# Patient Record
Sex: MALE | Race: WHITE | NOT HISPANIC OR LATINO | Employment: PART TIME | URBAN - METROPOLITAN AREA
[De-identification: names, ages, dates, MRNs, and addresses within clinical notes are randomized per-mention and may not be internally consistent; named-entity substitution may affect disease eponyms.]

---

## 2019-03-23 ENCOUNTER — HOSPITAL ENCOUNTER (INPATIENT)
Facility: OTHER | Age: 29
LOS: 6 days | Discharge: NURSING, REHAB OR LONG-TERM CARE | DRG: 917 | End: 2019-03-29
Attending: INTERNAL MEDICINE | Admitting: INTERNAL MEDICINE
Payer: MEDICAID

## 2019-03-23 DIAGNOSIS — I49.9 ARRHYTHMIA: ICD-10-CM

## 2019-03-23 DIAGNOSIS — E06.3 HASHIMOTO'S THYROIDITIS: Primary | Chronic | ICD-10-CM

## 2019-03-23 DIAGNOSIS — J96.90 RESPIRATORY FAILURE: ICD-10-CM

## 2019-03-23 DIAGNOSIS — J96.01 ACUTE RESPIRATORY FAILURE WITH HYPOXIA: ICD-10-CM

## 2019-03-23 PROBLEM — G93.41 ENCEPHALOPATHY, METABOLIC: Status: ACTIVE | Noted: 2019-03-23

## 2019-03-23 PROBLEM — T58.91XA TOXIC EFFECT OF CARBON MONOXIDE, UNINTENTIONAL: Status: ACTIVE | Noted: 2019-03-23

## 2019-03-23 PROBLEM — A41.9 SEVERE SEPSIS: Status: ACTIVE | Noted: 2019-03-23

## 2019-03-23 PROBLEM — M62.82 NON-TRAUMATIC RHABDOMYOLYSIS: Status: ACTIVE | Noted: 2019-03-23

## 2019-03-23 PROBLEM — R65.20 SEVERE SEPSIS: Status: ACTIVE | Noted: 2019-03-23

## 2019-03-23 PROBLEM — R74.01 TRANSAMINITIS: Status: ACTIVE | Noted: 2019-03-23

## 2019-03-23 PROBLEM — E87.6 HYPOKALEMIA: Status: ACTIVE | Noted: 2019-03-23

## 2019-03-23 PROBLEM — N17.9 AKI (ACUTE KIDNEY INJURY): Status: ACTIVE | Noted: 2019-03-23

## 2019-03-23 LAB
ALLENS TEST: ABNORMAL
ANION GAP SERPL CALC-SCNC: 8 MMOL/L
ANION GAP SERPL CALC-SCNC: 9 MMOL/L
BUN SERPL-MCNC: 14 MG/DL
BUN SERPL-MCNC: 16 MG/DL
CALCIUM SERPL-MCNC: 8.9 MG/DL
CALCIUM SERPL-MCNC: 9 MG/DL
CHLORIDE SERPL-SCNC: 108 MMOL/L
CHLORIDE SERPL-SCNC: 108 MMOL/L
CO2 SERPL-SCNC: 22 MMOL/L
CO2 SERPL-SCNC: 25 MMOL/L
CORTIS SERPL-MCNC: 23.8 UG/DL
CREAT SERPL-MCNC: 1.3 MG/DL
CREAT SERPL-MCNC: 1.3 MG/DL
DELSYS: ABNORMAL
EST. GFR  (AFRICAN AMERICAN): >60 ML/MIN/1.73 M^2
EST. GFR  (AFRICAN AMERICAN): >60 ML/MIN/1.73 M^2
EST. GFR  (NON AFRICAN AMERICAN): >60 ML/MIN/1.73 M^2
EST. GFR  (NON AFRICAN AMERICAN): >60 ML/MIN/1.73 M^2
FIO2: 100
GLUCOSE SERPL-MCNC: 80 MG/DL
GLUCOSE SERPL-MCNC: 87 MG/DL
HCO3 UR-SCNC: 24.1 MMOL/L (ref 24–28)
LACTATE SERPL-SCNC: 3 MMOL/L
LACTATE SERPL-SCNC: 3.8 MMOL/L
MAGNESIUM SERPL-MCNC: 2 MG/DL
MIN VOL: 7.24
MODE: ABNORMAL
PCO2 BLDA: 42.8 MMHG (ref 35–45)
PEEP: 5
PH SMN: 7.36 [PH] (ref 7.35–7.45)
PHOSPHATE SERPL-MCNC: 4 MG/DL
PO2 BLDA: 224 MMHG (ref 80–100)
POC BE: -1 MMOL/L
POC SATURATED O2: 100 % (ref 95–100)
POTASSIUM SERPL-SCNC: 4.1 MMOL/L
POTASSIUM SERPL-SCNC: 4.2 MMOL/L
PS: 5
SAMPLE: ABNORMAL
SITE: ABNORMAL
SODIUM SERPL-SCNC: 139 MMOL/L
SODIUM SERPL-SCNC: 141 MMOL/L
SP02: 100
SPONT RATE: 14

## 2019-03-23 PROCEDURE — 83735 ASSAY OF MAGNESIUM: CPT

## 2019-03-23 PROCEDURE — 99291 PR CRITICAL CARE, E/M 30-74 MINUTES: ICD-10-PCS | Mod: ,,, | Performed by: INTERNAL MEDICINE

## 2019-03-23 PROCEDURE — 84100 ASSAY OF PHOSPHORUS: CPT

## 2019-03-23 PROCEDURE — 63600175 PHARM REV CODE 636 W HCPCS: Performed by: INTERNAL MEDICINE

## 2019-03-23 PROCEDURE — 20000000 HC ICU ROOM

## 2019-03-23 PROCEDURE — 99291 CRITICAL CARE FIRST HOUR: CPT | Mod: ,,, | Performed by: INTERNAL MEDICINE

## 2019-03-23 PROCEDURE — 93010 ELECTROCARDIOGRAM REPORT: CPT | Mod: ,,, | Performed by: INTERNAL MEDICINE

## 2019-03-23 PROCEDURE — 94761 N-INVAS EAR/PLS OXIMETRY MLT: CPT

## 2019-03-23 PROCEDURE — 82533 TOTAL CORTISOL: CPT

## 2019-03-23 PROCEDURE — 25000003 PHARM REV CODE 250: Performed by: INTERNAL MEDICINE

## 2019-03-23 PROCEDURE — 93005 ELECTROCARDIOGRAM TRACING: CPT

## 2019-03-23 PROCEDURE — 36600 WITHDRAWAL OF ARTERIAL BLOOD: CPT

## 2019-03-23 PROCEDURE — 83605 ASSAY OF LACTIC ACID: CPT | Mod: 91

## 2019-03-23 PROCEDURE — 36415 COLL VENOUS BLD VENIPUNCTURE: CPT

## 2019-03-23 PROCEDURE — 27100171 HC OXYGEN HIGH FLOW UP TO 24 HOURS

## 2019-03-23 PROCEDURE — 82803 BLOOD GASES ANY COMBINATION: CPT

## 2019-03-23 PROCEDURE — 80048 BASIC METABOLIC PNL TOTAL CA: CPT | Mod: 91

## 2019-03-23 PROCEDURE — 93010 EKG 12-LEAD: ICD-10-PCS | Mod: ,,, | Performed by: INTERNAL MEDICINE

## 2019-03-23 PROCEDURE — 94002 VENT MGMT INPAT INIT DAY: CPT

## 2019-03-23 PROCEDURE — 99900035 HC TECH TIME PER 15 MIN (STAT)

## 2019-03-23 RX ORDER — LEVOTHYROXINE SODIUM ANHYDROUS 100 UG/5ML
100 INJECTION, POWDER, LYOPHILIZED, FOR SOLUTION INTRAVENOUS DAILY
Status: DISCONTINUED | OUTPATIENT
Start: 2019-03-23 | End: 2019-03-23

## 2019-03-23 RX ORDER — FAMOTIDINE 10 MG/ML
20 INJECTION INTRAVENOUS 2 TIMES DAILY
Status: DISCONTINUED | OUTPATIENT
Start: 2019-03-23 | End: 2019-03-23

## 2019-03-23 RX ORDER — CHLORHEXIDINE GLUCONATE ORAL RINSE 1.2 MG/ML
15 SOLUTION DENTAL 2 TIMES DAILY
Status: DISCONTINUED | OUTPATIENT
Start: 2019-03-23 | End: 2019-03-23

## 2019-03-23 RX ORDER — HALOPERIDOL 5 MG/ML
1 INJECTION INTRAMUSCULAR ONCE
Status: COMPLETED | OUTPATIENT
Start: 2019-03-23 | End: 2019-03-23

## 2019-03-23 RX ORDER — SODIUM CHLORIDE 0.9 % (FLUSH) 0.9 %
3 SYRINGE (ML) INJECTION
Status: DISCONTINUED | OUTPATIENT
Start: 2019-03-23 | End: 2019-03-29 | Stop reason: HOSPADM

## 2019-03-23 RX ORDER — LEVOTHYROXINE SODIUM ANHYDROUS 200 UG/5ML
200 INJECTION, POWDER, LYOPHILIZED, FOR SOLUTION INTRAVENOUS ONCE
Status: DISCONTINUED | OUTPATIENT
Start: 2019-03-23 | End: 2019-03-23

## 2019-03-23 RX ORDER — SODIUM CHLORIDE 9 MG/ML
INJECTION, SOLUTION INTRAVENOUS CONTINUOUS
Status: ACTIVE | OUTPATIENT
Start: 2019-03-23 | End: 2019-03-25

## 2019-03-23 RX ORDER — LEVOTHYROXINE SODIUM ANHYDROUS 100 UG/5ML
100 INJECTION, POWDER, LYOPHILIZED, FOR SOLUTION INTRAVENOUS ONCE
Status: COMPLETED | OUTPATIENT
Start: 2019-03-23 | End: 2019-03-23

## 2019-03-23 RX ORDER — PROPOFOL 10 MG/ML
5 INJECTION, EMULSION INTRAVENOUS CONTINUOUS
Status: DISCONTINUED | OUTPATIENT
Start: 2019-03-23 | End: 2019-03-23

## 2019-03-23 RX ADMIN — HALOPERIDOL LACTATE 1 MG: 5 INJECTION, SOLUTION INTRAMUSCULAR at 09:03

## 2019-03-23 RX ADMIN — HYDROCORTISONE SODIUM SUCCINATE 100 MG: 100 INJECTION, POWDER, FOR SOLUTION INTRAMUSCULAR; INTRAVENOUS at 05:03

## 2019-03-23 RX ADMIN — LEVOTHYROXINE SODIUM ANHYDROUS 100 MCG: 100 INJECTION, POWDER, LYOPHILIZED, FOR SOLUTION INTRAVENOUS at 05:03

## 2019-03-23 RX ADMIN — SODIUM CHLORIDE: 0.9 INJECTION, SOLUTION INTRAVENOUS at 03:03

## 2019-03-23 RX ADMIN — LEVOTHYROXINE SODIUM 175 MCG: 100 TABLET ORAL at 02:03

## 2019-03-23 RX ADMIN — HYDROCORTISONE SODIUM SUCCINATE 100 MG: 100 INJECTION, POWDER, FOR SOLUTION INTRAMUSCULAR; INTRAVENOUS at 09:03

## 2019-03-23 RX ADMIN — PIPERACILLIN AND TAZOBACTAM 4.5 G: 4; .5 INJECTION, POWDER, LYOPHILIZED, FOR SOLUTION INTRAVENOUS; PARENTERAL at 05:03

## 2019-03-23 RX ADMIN — SODIUM CHLORIDE: 0.9 INJECTION, SOLUTION INTRAVENOUS at 07:03

## 2019-03-23 NOTE — ASSESSMENT & PLAN NOTE
- ?2/2 poor perfusion; s/p 30mL/kg IVFs at [facility].  - Continue to monitor.

## 2019-03-23 NOTE — ASSESSMENT & PLAN NOTE
- ?2/2 poor perfusion; s/p 30mL/kg IVFs at [facility].  - Continue to monitor.

## 2019-03-23 NOTE — ASSESSMENT & PLAN NOTE
- Acute respiratory failure, suspect in the setting of carbon monoxide poisoning vs. sepsis. CXR with some atelectasis bilateral bases, also noted on CT. Does have elevations in creatinine and liver enzymes that could reflect poor oxygenation / blood flow.  - Carboxyhemoglobin reported via Flight Team of 7.2; unable to repeat here, unfortunately.  - ?infectious but less remarkable from story; continue empiric antibiotics with piperacillin-tazobactam 4.5g IV q8hr.  - Discuss with pulm/crit; likely may be able to extubate if mental status improving. Would continue with NRB at 15L to flush out CO as feasible.

## 2019-03-23 NOTE — H&P
[facility] Hospital Medicine  History & Physical    Patient Name: Dorian Murray  MRN: [id number]  Admission Date: 3/23/2019  Attending Physician: PAPI Villagran MD  Primary Care Provider: No primary care provider on file.    Patient information was obtained from parent and ER records.     Subjective:     Principal Problem:Acute respiratory failure with hypoxia    Chief Complaint: No chief complaint on file.       HPI: Mr. Murray is a 29/M with reported PMH of Hashimoto's who presented to [facility] ED via EMS after having been found altered on the morning of 03/23. History obtained from the patient's mother as well as ED notes. Reportedly he was found down in his car ([employer]) outside a gas station; had been noted on security cameras at gas station around 0345. Supposedly was last seen normal by his ex-wife the evening of 03/22 when he took his daughter to dinner. EMS arrived after being notified by gas station personnel after his car was stationary and still running. EMS arrived and bagged the patient, and he was transported to [facility] ED. He was subsequently intubated for airway protection and hypoxic respiratory failure. Reportedly, his carboxyhemoglobin on ABG was measured at 7.2.    Patient's mother reports that he has a history of Hashimoto's; she has thyroid issues as well. Supposedly he had been on thyroid medication in the remote past, but she believes he has not been taking his medication for a significant period of time.    Past Medical History:   Diagnosis Date    Hashimoto's thyroiditis        History reviewed. No pertinent surgical history.    Review of patient's allergies indicates:  Not on File    Current Facility-Administered Medications on File Prior to Encounter   Medication    [COMPLETED] albuterol-ipratropium 2.5 mg-0.5 mg/3 mL nebulizer solution 3 mL    [COMPLETED] naloxone injection 0.4 mg    [COMPLETED] naloxone injection 0.4 mg     [COMPLETED] naloxone injection 0.4 mg    [COMPLETED] naloxone injection 0.4 mg    [COMPLETED] piperacillin-tazobactam 4.5 g in sodium chloride 0.9% 100 mL IVPB (ready to mix system)    [COMPLETED] potassium chloride 10 mEq in 100 mL IVPB    [COMPLETED] potassium chloride 10% oral solution 60 mEq    [COMPLETED] sodium chloride 0.9% bolus 2,721 mL    [COMPLETED] succinylcholine injection 100 mg    [COMPLETED] vancomycin 1 g in 0.9% sodium chloride 250 mL IVPB (ready to mix system)     No current outpatient medications on file prior to encounter.     Family History     Problem Relation (Age of Onset)    Thyroid disease Mother        Tobacco Use    Smoking status: Not on file   Substance and Sexual Activity    Alcohol use: Not on file    Drug use: Not on file    Sexual activity: Not on file     Review of Systems   Unable to perform ROS: Intubated     Objective:     Vital Signs (Most Recent):  Temp: 98.1 °F (36.7 °C) (03/23/19 1230)  Pulse: 86 (03/23/19 1233)  Resp: 18 (03/23/19 1230)  BP: 108/68 (03/23/19 1230)  SpO2: 96 % (03/23/19 1233) Vital Signs (24h Range):  Temp:  [96.2 °F (35.7 °C)-98.1 °F (36.7 °C)] 98.1 °F (36.7 °C)  Pulse:  [] 86  Resp:  [16-21] 18  SpO2:  [80 %-100 %] 96 %  BP: (101-130)/(45-85) 108/68     Weight: 99.6 kg (219 lb 9.3 oz)  Body mass index is 33.39 kg/m².    Physical Exam   Constitutional: He appears well-developed and well-nourished. No distress.   HENT:   Head: Normocephalic and atraumatic.   Eyes: Conjunctivae and EOM are normal.   Cardiovascular: Normal rate, regular rhythm, S1 normal, S2 normal and intact distal pulses.   Pulmonary/Chest: Effort normal. No respiratory distress.   Intubated, mechanical sounds bilaterally   Abdominal: Soft. He exhibits no distension. There is no tenderness.   Musculoskeletal: Normal range of motion. He exhibits no edema.   Neurological: He is alert.   CAM-ICU +, RASS 0   Skin: Skin is warm and dry.   Nursing note and vitals  reviewed.    Significant Labs:   Recent Results (from the past 24 hour(s))   CBC auto differential    Collection Time: 03/23/19  8:45 AM   Result Value Ref Range    WBC 18.00 (H) 3.90 - 12.70 K/uL    RBC 4.18 (L) 4.60 - 6.20 M/uL    Hemoglobin 13.2 (L) 14.0 - 18.0 g/dL    Hematocrit 38.0 (L) 40.0 - 54.0 %    MCV 91 82 - 98 fL    MCH 31.6 (H) 27.0 - 31.0 pg    MCHC 34.8 32.0 - 36.0 g/dL    RDW 14.9 (H) 11.5 - 14.5 %    Platelets 257 150 - 350 K/uL    MPV 8.5 (L) 9.2 - 12.9 fL    Gran # (ANC) 12.2 (H) 1.8 - 7.7 K/uL    Lymph # 4.7 1.0 - 4.8 K/uL    Mono # 0.8 0.3 - 1.0 K/uL    Eos # 0.2 0.0 - 0.5 K/uL    Baso # 0.10 0.00 - 0.20 K/uL    Gran% 67.8 38.0 - 73.0 %    Lymph% 26.0 18.0 - 48.0 %    Mono% 4.3 4.0 - 15.0 %    Eosinophil% 1.2 0.0 - 8.0 %    Basophil% 0.7 0.0 - 1.9 %    Differential Method Automated    Comprehensive metabolic panel    Collection Time: 03/23/19  8:45 AM   Result Value Ref Range    Sodium 138 136 - 145 mmol/L    Potassium 2.4 (LL) 3.5 - 5.1 mmol/L    Chloride 99 (L) 101 - 111 mmol/L    CO2 20 (L) 23 - 29 mmol/L    Glucose 193 (H) 74 - 118 mg/dL    BUN, Bld 19 6 - 20 mg/dL    Creatinine 1.6 (H) 0.5 - 1.4 mg/dL    Calcium 9.2 8.6 - 10.0 mg/dL    Total Protein 7.9 6.0 - 8.4 g/dL    Albumin 4.4 3.5 - 5.2 g/dL    Total Bilirubin 0.6 0.3 - 1.2 mg/dL    Alkaline Phosphatase 44 38 - 126 U/L     (H) 15 - 41 U/L     (H) 17 - 63 U/L    Anion Gap 19 (H) 8 - 16 mmol/L    eGFR if African American >60.0 >60 mL/min/1.73 m^2    eGFR if non  56.6 (A) >60 mL/min/1.73 m^2   Troponin I    Collection Time: 03/23/19  8:45 AM   Result Value Ref Range    Troponin I 0.02 0.01 - 0.05 ng/mL   POCT glucose    Collection Time: 03/23/19  8:56 AM   Result Value Ref Range    POCT Glucose 172 (H) 70 - 110 mg/dL   Urinalysis, Reflex to Urine Culture Urine, Clean Catch    Collection Time: 03/23/19  9:09 AM   Result Value Ref Range    Specimen UA Urine, Catheterized     Color, UA Yellow Yellow, Straw,  Sil    Appearance, UA Clear Clear    pH, UA 6.0 5.0 - 8.0    Specific Gravity, UA 1.025 1.005 - 1.030    Protein, UA Negative Negative    Glucose, UA Negative Negative    Ketones, UA Negative Negative    Bilirubin (UA) Negative Negative    Occult Blood UA Negative Negative    Nitrite, UA Negative Negative    Urobilinogen, UA Negative Negative EU/dL    Leukocytes, UA Negative Negative   Drug screen panel, emergency    Collection Time: 03/23/19  9:09 AM   Result Value Ref Range    Amphetamine Screen, Ur Negative     Barbiturate Screen, Ur Negative     Benzodiazepines Negative     Cocaine (Metab.) Negative     Opiate Scrn, Ur Negative     Phencyclidine Negative     THC Negative     Tricyclic Antidepressants (TCA), Urine Negative     MDMA- ECSTASY Negative     OXYCODONE Negative     PROPOXYPHENE Negative     Toxicology Information SEE COMMENT    Lactic acid, plasma #1    Collection Time: 03/23/19  9:15 AM   Result Value Ref Range    Lactate (Lactic Acid) 6.6 (HH) 0.5 - 2.2 mmol/L   Ethanol    Collection Time: 03/23/19  9:15 AM   Result Value Ref Range    Alcohol, Medical, Serum <5 <10 mg/dL   TSH    Collection Time: 03/23/19  9:15 AM   Result Value Ref Range    TSH >100.00 (H) 0.45 - 5.33 uIU/mL   ISTAT PROCEDURE    Collection Time: 03/23/19  9:35 AM   Result Value Ref Range    POC PH 7.370 7.35 - 7.45    POC PCO2 35.7 35 - 45 mmHg    POC PO2 76 (L) 80 - 100 mmHg    POC HCO3 20.6 (L) 24 - 28 mmol/L    POC BE -5 -2 to 2 mmol/L    POC SATURATED O2 95 95 - 100 %    POC TCO2 22 (L) 23 - 27 mmol/L    POC Hematocrit 32 (L) 36 - 54 %PCV    POC HEMOGLOBIN 11 g/dL    Rate 16     Sample ARTERIAL     Site LR     Allens Test Pass     DelSys Adult Vent     Mode AC/PRVC     Vt 500     PEEP 5     FiO2 100    ISTAT PROCEDURE    Collection Time: 03/23/19 11:26 AM   Result Value Ref Range    POC PH 7.356 7.35 - 7.45    POC PCO2 41.0 35 - 45 mmHg    POC PO2 133 (H) 80 - 100 mmHg    POC HCO3 22.9 (L) 24 - 28 mmol/L    POC BE -3 -2 to 2  mmol/L    POC SATURATED O2 99 95 - 100 %    POC TCO2 24 23 - 27 mmol/L    POC Hematocrit 29 (L) 36 - 54 %PCV    POC HEMOGLOBIN 10 g/dL    Rate 16     Sample ARTERIAL     Site LR     Allens Test Pass     DelSys Adult Vent     Mode AC/PRVC     Vt 500     PEEP 5     FiO2 100      Significant Imaging:   CT Head WO Contrast 03/23/19:  No acute intracranial findings.  Prominence of the pituitary gland suggested as discussed above and follow-up MRI of the sella can be obtained to better assess.  Otherwise normal CT appearance of brain.    CXR 03/23/19:  Increasing pulmonary opacities bilaterally, atelectasis or pneumonia. Endotracheal and NG tube positions appear to be satisfactory.    CT Abd/Pelvis WO Contrast 03/23/19:  Bilateral lower lobe airspace disease with complete consolidation except for bronchi and lower lobe segments basally, pneumonia versus atelectasis. No significant abdominal or pelvic findings or acute abdominal or pelvic abnormality seen.    Assessment/Plan:     * Acute respiratory failure with hypoxia    - Acute respiratory failure, suspect in the setting of carbon monoxide poisoning vs. sepsis. CXR with some atelectasis bilateral bases, also noted on CT. Does have elevations in creatinine and liver enzymes that could reflect poor oxygenation / blood flow.  - Carboxyhemoglobin reported via Flight Team of 7.2; unable to repeat here, unfortunately.  - ?infectious but less remarkable from story; continue empiric antibiotics with piperacillin-tazobactam 4.5g IV q8hr.  - Discuss with pulm/crit; likely may be able to extubate if mental status improving. Would continue with NRB at 15L to flush out CO as feasible.     Toxic effect of carbon monoxide, unintentional    - As under respiratory failure.     Hashimoto's thyroiditis    - Reported history of Hashimoto's without treatment; TSH noted to be significantly elevated. FT4 pending. May give IV T4 pending results.     Hypokalemia    - Repletion started at [facility];  continue.     Severe sepsis    - As under acute respiratory failure.     Encephalopathy, metabolic    - Likely 2/2 acute respiratory failure.     KLAUDIA (acute kidney injury)    - ?2/2 poor perfusion; s/p 30mL/kg IVFs at [facility].  - Continue to monitor.     Transaminitis    - ?2/2 poor perfusion; s/p 30mL/kg IVFs at [facility].  - Continue to monitor.       VTE Risk Mitigation (From admission, onward)        Ordered     IP VTE LOW RISK PATIENT  Once      03/23/19 1228     Place sequential compression device  Until discontinued      03/23/19 1228        Critical care time spent on the evaluation and treatment of severe organ dysfunction, review of pertinent labs and imaging studies, discussions with consulting providers and discussions with patient/family: 45 minutes.    PAPI Villagran MD  Department of Hospital Medicine   [facility]

## 2019-03-23 NOTE — SUBJECTIVE & OBJECTIVE
Past Medical History:   Diagnosis Date    Hashimoto's thyroiditis        History reviewed. No pertinent surgical history.    Review of patient's allergies indicates:  Not on File    Current Facility-Administered Medications on File Prior to Encounter   Medication    [COMPLETED] albuterol-ipratropium 2.5 mg-0.5 mg/3 mL nebulizer solution 3 mL    [COMPLETED] naloxone injection 0.4 mg    [COMPLETED] naloxone injection 0.4 mg    [COMPLETED] naloxone injection 0.4 mg    [COMPLETED] naloxone injection 0.4 mg    [COMPLETED] piperacillin-tazobactam 4.5 g in sodium chloride 0.9% 100 mL IVPB (ready to mix system)    [COMPLETED] potassium chloride 10 mEq in 100 mL IVPB    [COMPLETED] potassium chloride 10% oral solution 60 mEq    [COMPLETED] sodium chloride 0.9% bolus 2,721 mL    [COMPLETED] succinylcholine injection 100 mg    [COMPLETED] vancomycin 1 g in 0.9% sodium chloride 250 mL IVPB (ready to mix system)     No current outpatient medications on file prior to encounter.     Family History     Problem Relation (Age of Onset)    Thyroid disease Mother        Tobacco Use    Smoking status: Not on file   Substance and Sexual Activity    Alcohol use: Not on file    Drug use: Not on file    Sexual activity: Not on file     Review of Systems   Unable to perform ROS: Intubated     Objective:     Vital Signs (Most Recent):  Temp: 98.1 °F (36.7 °C) (03/23/19 1230)  Pulse: 86 (03/23/19 1233)  Resp: 18 (03/23/19 1230)  BP: 108/68 (03/23/19 1230)  SpO2: 96 % (03/23/19 1233) Vital Signs (24h Range):  Temp:  [96.2 °F (35.7 °C)-98.1 °F (36.7 °C)] 98.1 °F (36.7 °C)  Pulse:  [] 86  Resp:  [16-21] 18  SpO2:  [80 %-100 %] 96 %  BP: (101-130)/(45-85) 108/68     Weight: 99.6 kg (219 lb 9.3 oz)  Body mass index is 33.39 kg/m².    Physical Exam   Constitutional: He appears well-developed and well-nourished. No distress.   HENT:   Head: Normocephalic and atraumatic.   Eyes: Conjunctivae and EOM are normal.   Cardiovascular:  Normal rate, regular rhythm, S1 normal, S2 normal and intact distal pulses.   Pulmonary/Chest: Effort normal. No respiratory distress.   Intubated, mechanical sounds bilaterally   Abdominal: Soft. He exhibits no distension. There is no tenderness.   Musculoskeletal: Normal range of motion. He exhibits no edema.   Neurological: He is alert.   CAM-ICU +, RASS 0   Skin: Skin is warm and dry.   Nursing note and vitals reviewed.    Significant Labs:   Recent Results (from the past 24 hour(s))   CBC auto differential    Collection Time: 03/23/19  8:45 AM   Result Value Ref Range    WBC 18.00 (H) 3.90 - 12.70 K/uL    RBC 4.18 (L) 4.60 - 6.20 M/uL    Hemoglobin 13.2 (L) 14.0 - 18.0 g/dL    Hematocrit 38.0 (L) 40.0 - 54.0 %    MCV 91 82 - 98 fL    MCH 31.6 (H) 27.0 - 31.0 pg    MCHC 34.8 32.0 - 36.0 g/dL    RDW 14.9 (H) 11.5 - 14.5 %    Platelets 257 150 - 350 K/uL    MPV 8.5 (L) 9.2 - 12.9 fL    Gran # (ANC) 12.2 (H) 1.8 - 7.7 K/uL    Lymph # 4.7 1.0 - 4.8 K/uL    Mono # 0.8 0.3 - 1.0 K/uL    Eos # 0.2 0.0 - 0.5 K/uL    Baso # 0.10 0.00 - 0.20 K/uL    Gran% 67.8 38.0 - 73.0 %    Lymph% 26.0 18.0 - 48.0 %    Mono% 4.3 4.0 - 15.0 %    Eosinophil% 1.2 0.0 - 8.0 %    Basophil% 0.7 0.0 - 1.9 %    Differential Method Automated    Comprehensive metabolic panel    Collection Time: 03/23/19  8:45 AM   Result Value Ref Range    Sodium 138 136 - 145 mmol/L    Potassium 2.4 (LL) 3.5 - 5.1 mmol/L    Chloride 99 (L) 101 - 111 mmol/L    CO2 20 (L) 23 - 29 mmol/L    Glucose 193 (H) 74 - 118 mg/dL    BUN, Bld 19 6 - 20 mg/dL    Creatinine 1.6 (H) 0.5 - 1.4 mg/dL    Calcium 9.2 8.6 - 10.0 mg/dL    Total Protein 7.9 6.0 - 8.4 g/dL    Albumin 4.4 3.5 - 5.2 g/dL    Total Bilirubin 0.6 0.3 - 1.2 mg/dL    Alkaline Phosphatase 44 38 - 126 U/L     (H) 15 - 41 U/L     (H) 17 - 63 U/L    Anion Gap 19 (H) 8 - 16 mmol/L    eGFR if African American >60.0 >60 mL/min/1.73 m^2    eGFR if non  56.6 (A) >60 mL/min/1.73 m^2    Troponin I    Collection Time: 03/23/19  8:45 AM   Result Value Ref Range    Troponin I 0.02 0.01 - 0.05 ng/mL   POCT glucose    Collection Time: 03/23/19  8:56 AM   Result Value Ref Range    POCT Glucose 172 (H) 70 - 110 mg/dL   Urinalysis, Reflex to Urine Culture Urine, Clean Catch    Collection Time: 03/23/19  9:09 AM   Result Value Ref Range    Specimen UA Urine, Catheterized     Color, UA Yellow Yellow, Straw, Sil    Appearance, UA Clear Clear    pH, UA 6.0 5.0 - 8.0    Specific Gravity, UA 1.025 1.005 - 1.030    Protein, UA Negative Negative    Glucose, UA Negative Negative    Ketones, UA Negative Negative    Bilirubin (UA) Negative Negative    Occult Blood UA Negative Negative    Nitrite, UA Negative Negative    Urobilinogen, UA Negative Negative EU/dL    Leukocytes, UA Negative Negative   Drug screen panel, emergency    Collection Time: 03/23/19  9:09 AM   Result Value Ref Range    Amphetamine Screen, Ur Negative     Barbiturate Screen, Ur Negative     Benzodiazepines Negative     Cocaine (Metab.) Negative     Opiate Scrn, Ur Negative     Phencyclidine Negative     THC Negative     Tricyclic Antidepressants (TCA), Urine Negative     MDMA- ECSTASY Negative     OXYCODONE Negative     PROPOXYPHENE Negative     Toxicology Information SEE COMMENT    Lactic acid, plasma #1    Collection Time: 03/23/19  9:15 AM   Result Value Ref Range    Lactate (Lactic Acid) 6.6 (HH) 0.5 - 2.2 mmol/L   Ethanol    Collection Time: 03/23/19  9:15 AM   Result Value Ref Range    Alcohol, Medical, Serum <5 <10 mg/dL   TSH    Collection Time: 03/23/19  9:15 AM   Result Value Ref Range    TSH >100.00 (H) 0.45 - 5.33 uIU/mL   ISTAT PROCEDURE    Collection Time: 03/23/19  9:35 AM   Result Value Ref Range    POC PH 7.370 7.35 - 7.45    POC PCO2 35.7 35 - 45 mmHg    POC PO2 76 (L) 80 - 100 mmHg    POC HCO3 20.6 (L) 24 - 28 mmol/L    POC BE -5 -2 to 2 mmol/L    POC SATURATED O2 95 95 - 100 %    POC TCO2 22 (L) 23 - 27 mmol/L    POC  Hematocrit 32 (L) 36 - 54 %PCV    POC HEMOGLOBIN 11 g/dL    Rate 16     Sample ARTERIAL     Site LR     Allens Test Pass     DelSys Adult Vent     Mode AC/PRVC     Vt 500     PEEP 5     FiO2 100    ISTAT PROCEDURE    Collection Time: 03/23/19 11:26 AM   Result Value Ref Range    POC PH 7.356 7.35 - 7.45    POC PCO2 41.0 35 - 45 mmHg    POC PO2 133 (H) 80 - 100 mmHg    POC HCO3 22.9 (L) 24 - 28 mmol/L    POC BE -3 -2 to 2 mmol/L    POC SATURATED O2 99 95 - 100 %    POC TCO2 24 23 - 27 mmol/L    POC Hematocrit 29 (L) 36 - 54 %PCV    POC HEMOGLOBIN 10 g/dL    Rate 16     Sample ARTERIAL     Site LR     Allens Test Pass     DelSys Adult Vent     Mode AC/PRVC     Vt 500     PEEP 5     FiO2 100      Significant Imaging:   CT Head WO Contrast 03/23/19:  No acute intracranial findings.  Prominence of the pituitary gland suggested as discussed above and follow-up MRI of the sella can be obtained to better assess.  Otherwise normal CT appearance of brain.    CXR 03/23/19:  Increasing pulmonary opacities bilaterally, atelectasis or pneumonia. Endotracheal and NG tube positions appear to be satisfactory.    CT Abd/Pelvis WO Contrast 03/23/19:  Bilateral lower lobe airspace disease with complete consolidation except for bronchi and lower lobe segments basally, pneumonia versus atelectasis. No significant abdominal or pelvic findings or acute abdominal or pelvic abnormality seen.

## 2019-03-23 NOTE — CONSULTS
"[facility] Pulmonology/Critical Care Consult Note    Primary Team Admitting:  Tyshawn  Consultant Team Attending:  Wale  Consultant Team Fellow: Carolina    Date of Consult: 2019    Reason for Consult:      Acute Hypercapnic Hypoxic Respiratory Failure    Subjective:      History of Present Illness:  Dorian Murray is a 29 y.o. male with reported PMH of Hashimoto's who presented to [facility] ED via EMS after having been found altered on the morning of . History obtained from chart, the patient's mother as well as ED notes. Pt was reportedly found unresponsive in his car ( [employer]) outside a gas station, as seen on security cameras at gas station around 0345. He was last seen normal by his ex-wife the evening of  when he took his daughter to dinner. EMS arrived after being notified by gas station personnel that his car had remained stationary and was still running. EMS arrived and found him unresponsive.  He was placed on BVM and transported to [facility] ED where he was subsequently intubated for airway protection and hypoxic respiratory failure. Reportedly, his carboxyhemoglobin on ABG was measured at 7.2.  Patient's mother reports that he has a history of Hashimoto's, but believes he has not been taking his medication for a significant period of time despite being prescribed it in the past.        Past Medical History:  Past Medical History:   Diagnosis Date    Hashimoto's thyroiditis        Allergies:  Review of patient's allergies indicates:  No Known Allergies    Home Medications:  Prior to Admission medications    Not on File          Objective:   Last 24 Hour Vital Signs:  BP  Min: 100/59  Max: 130/62  Temp  Av.7 °F (36.5 °C)  Min: 96.2 °F (35.7 °C)  Max: 98.9 °F (37.2 °C)  Pulse  Av.3  Min: 84  Max: 152  Resp  Av.6  Min: 13  Max: 21  SpO2  Av %  Min: 80 %  Max: 100 %  Height  Av' 8" (172.7 cm)  Min: 5' 8" (172.7 cm)  Max: 5' 8" (172.7 cm)  Weight  Avg: " 93.7 kg (206 lb 8.4 oz)  Min: 90.7 kg (200 lb)  Max: 99.6 kg (219 lb 9.3 oz)  Body mass index is 33.39 kg/m².  No intake/output data recorded.    Physical Examination:  General: Drowsy but arouseable.  Obese   HENT:  NCAT; anicteric sclera; ETT in place  Cardio:  Regular rate and rhythm with normal S1 and S2; no murmurs  Resp:  Mechanical breath sounds bilaterally.  No wheezes.   Abdom: Soft, NTND with normoactive bowel sounds  Extrem: Warm and dry.  No edema  Pulses: 2+ and symmetric distally  Neuro:  RASS=0.  Following commands with no apparent focal deficits      Laboratory:  Most Recent Data:  Lab Results   Component Value Date    WBC 18.00 (H) 03/23/2019    HGB 13.2 (L) 03/23/2019    HCT 29 (L) 03/23/2019    MCV 91 03/23/2019     03/23/2019     Lab Results   Component Value Date    CREATININE 1.3 03/23/2019    BUN 16 03/23/2019     03/23/2019    K 4.2 03/23/2019     03/23/2019    CO2 25 03/23/2019     Lab Results   Component Value Date     (H) 03/23/2019     (H) 03/23/2019    ALKPHOS 44 03/23/2019    BILITOT 0.6 03/23/2019         Radiology:  CT Chest -- bilateral lower lobe dense consolidation       Assessment:     Dorian Murray is a 29 y.o. male with:     Plan:     NEURO:  - CT head neg. RASS = 0.  Following commands.  No deficits    CARDIO:  - Hemodynamically stable.  No hx known cardiac disease  - LA now down trending. CPK 10k. Continue trending  - Agree with volume resuscitation but caution to avoid excessive IVF  - Recommend changing from NS to LR as well    RESP:  - CT with BLL dense consolidations, poss atelectasis vs aspiration  - No response to Narcan.  Tox neg. Now extubated to NRB.    - COHb elevated at OSH, unable to trend at [facility]  - Half-life CO 90min on 100% FiO2. Continue NRB mask through the evening    FEN/GI:  - CTAP neg.  CPK elevated.  IVF as noted above  - advance diet as tolerated    RENAL:  - initial KLAUDIA rapidly resolved.  IVF as noted  above  - avoid nephrotoxins/NSAIDS    HEME/ID:  - Hbg stable.  No active bleeding. Afebrile with elevated WBC  - Low suspicion for septic/infectious etiology  - Reasonable to cover with empiric ABx pending Cx    ENDO:  - hx thyroid disease. Non-adherent to meds. TSH >100 and fT4 low.  - This is likely etiology of decompensation and acute illness  - Agree with initiation of Synthroid    VTE PPx:  [facility]    Disposition:  Now extubated and stable    Thank you for the consult.  Please feel free to contact us with any questions or concerns regarding the care of this patient.     Crow Figueroa MD  [employer] Pulmonology/Critical Care, [facility]

## 2019-03-23 NOTE — HPI
Mr. Murray is a 29/M with reported PMH of Hashimoto's who presented to [facility] ED via EMS after having been found altered on the morning of 03/23. History obtained from the patient's mother as well as ED notes. Reportedly he was found down in his car (2000 [employer]) outside a gas station; had been noted on security cameras at gas station around 0345. Supposedly was last seen normal by his ex-wife the evening of 03/22 when he took his daughter to dinner. EMS arrived after being notified by gas station personnel after his car was stationary and still running. EMS arrived and bagged the patient, and he was transported to [facility] ED. He was subsequently intubated for airway protection and hypoxic respiratory failure. Reportedly, his carboxyhemoglobin on ABG was measured at 7.2.    Patient's mother reports that he has a history of Hashimoto's; she has thyroid issues as well. Supposedly he had been on thyroid medication in the remote past, but she believes he has not been taking his medication for a significant period of time.

## 2019-03-23 NOTE — PLAN OF CARE
Problem: Adult Inpatient Plan of Care  Goal: Plan of Care Review  Outcome: Ongoing (interventions implemented as appropriate)  1220: Pt received from [facility] by flight team intubated with a 7.5 ETT secured at the 23cm caty. Placed pt on vent with documented settings. Dr Figueroa,[facility], placed pt on Spont settings at about 1300. Later ABG obtained & results given to Dr Villagran. Verbal order to extubate pt.     1350: Pt extubated to 100% NRB. Will continue to monitor.

## 2019-03-23 NOTE — ASSESSMENT & PLAN NOTE
- Reported history of Hashimoto's without treatment; TSH noted to be significantly elevated. FT4 pending. May give IV T4 pending results.

## 2019-03-23 NOTE — PLAN OF CARE
Problem: Adult Inpatient Plan of Care  Goal: Plan of Care Review  Outcome: Ongoing (interventions implemented as appropriate)  Pt extubated successfully to non-rebreather at 1350.  Pt remains disoriented to time, place, and situation.  Frequently reoriented to situation by nurse and family.  LEILA Gao with adequate uop.  Safety maintained.  Will continue to monitor.

## 2019-03-24 PROBLEM — E03.5 MYXEDEMA COMA: Status: ACTIVE | Noted: 2019-03-23

## 2019-03-24 PROBLEM — D64.9 ANEMIA: Status: ACTIVE | Noted: 2019-03-24

## 2019-03-24 PROBLEM — E06.3 HASHIMOTO'S THYROIDITIS: Chronic | Status: ACTIVE | Noted: 2019-03-23

## 2019-03-24 LAB
ALBUMIN SERPL BCP-MCNC: 4 G/DL (ref 3.5–5.2)
ALP SERPL-CCNC: 42 U/L (ref 55–135)
ALT SERPL W/O P-5'-P-CCNC: 167 U/L (ref 10–44)
ANION GAP SERPL CALC-SCNC: 8 MMOL/L (ref 8–16)
AST SERPL-CCNC: 225 U/L (ref 10–40)
BASOPHILS # BLD AUTO: 0.01 K/UL (ref 0–0.2)
BASOPHILS NFR BLD: 0.1 % (ref 0–1.9)
BILIRUB DIRECT SERPL-MCNC: 0.1 MG/DL (ref 0.1–0.3)
BILIRUB SERPL-MCNC: 0.4 MG/DL (ref 0.1–1)
BUN SERPL-MCNC: 11 MG/DL (ref 6–20)
CALCIUM SERPL-MCNC: 9.1 MG/DL (ref 8.7–10.5)
CHLORIDE SERPL-SCNC: 105 MMOL/L (ref 95–110)
CK SERPL-CCNC: 9245 U/L (ref 20–200)
CO2 SERPL-SCNC: 24 MMOL/L (ref 23–29)
CREAT SERPL-MCNC: 1.1 MG/DL (ref 0.5–1.4)
DIFFERENTIAL METHOD: ABNORMAL
EOSINOPHIL # BLD AUTO: 0 K/UL (ref 0–0.5)
EOSINOPHIL NFR BLD: 0 % (ref 0–8)
ERYTHROCYTE [DISTWIDTH] IN BLOOD BY AUTOMATED COUNT: 14.7 % (ref 11.5–14.5)
EST. GFR  (AFRICAN AMERICAN): >60 ML/MIN/1.73 M^2
EST. GFR  (NON AFRICAN AMERICAN): >60 ML/MIN/1.73 M^2
GLUCOSE SERPL-MCNC: 100 MG/DL (ref 70–110)
HCT VFR BLD AUTO: 32.2 % (ref 40–54)
HGB BLD-MCNC: 10.6 G/DL (ref 14–18)
IRON SERPL-MCNC: 59 UG/DL (ref 45–160)
LYMPHOCYTES # BLD AUTO: 1.3 K/UL (ref 1–4.8)
LYMPHOCYTES NFR BLD: 12 % (ref 18–48)
MAGNESIUM SERPL-MCNC: 2 MG/DL (ref 1.6–2.6)
MCH RBC QN AUTO: 29.9 PG (ref 27–31)
MCHC RBC AUTO-ENTMCNC: 32.9 G/DL (ref 32–36)
MCV RBC AUTO: 91 FL (ref 82–98)
MONOCYTES # BLD AUTO: 0.3 K/UL (ref 0.3–1)
MONOCYTES NFR BLD: 2.6 % (ref 4–15)
NEUTROPHILS # BLD AUTO: 8.9 K/UL (ref 1.8–7.7)
NEUTROPHILS NFR BLD: 85 % (ref 38–73)
PHOSPHATE SERPL-MCNC: 3.1 MG/DL (ref 2.7–4.5)
PLATELET # BLD AUTO: 176 K/UL (ref 150–350)
PMV BLD AUTO: 9.7 FL (ref 9.2–12.9)
POTASSIUM SERPL-SCNC: 3.7 MMOL/L (ref 3.5–5.1)
PROT SERPL-MCNC: 7.1 G/DL (ref 6–8.4)
RBC # BLD AUTO: 3.55 M/UL (ref 4.6–6.2)
RETICS/RBC NFR AUTO: 1.2 % (ref 0.4–2)
SATURATED IRON: 21 % (ref 20–50)
SODIUM SERPL-SCNC: 137 MMOL/L (ref 136–145)
TOTAL IRON BINDING CAPACITY: 287 UG/DL (ref 250–450)
TRANSFERRIN SERPL-MCNC: 194 MG/DL (ref 200–375)
WBC # BLD AUTO: 10.51 K/UL (ref 3.9–12.7)

## 2019-03-24 PROCEDURE — 99291 PR CRITICAL CARE, E/M 30-74 MINUTES: ICD-10-PCS | Mod: ,,, | Performed by: INTERNAL MEDICINE

## 2019-03-24 PROCEDURE — 82607 VITAMIN B-12: CPT

## 2019-03-24 PROCEDURE — 84100 ASSAY OF PHOSPHORUS: CPT

## 2019-03-24 PROCEDURE — 27000646 HC AEROBIKA DEVICE

## 2019-03-24 PROCEDURE — 82746 ASSAY OF FOLIC ACID SERUM: CPT

## 2019-03-24 PROCEDURE — 99233 SBSQ HOSP IP/OBS HIGH 50: CPT | Mod: ,,, | Performed by: INTERNAL MEDICINE

## 2019-03-24 PROCEDURE — 80076 HEPATIC FUNCTION PANEL: CPT

## 2019-03-24 PROCEDURE — 99291 CRITICAL CARE FIRST HOUR: CPT | Mod: ,,, | Performed by: INTERNAL MEDICINE

## 2019-03-24 PROCEDURE — 25000003 PHARM REV CODE 250: Performed by: INTERNAL MEDICINE

## 2019-03-24 PROCEDURE — 83735 ASSAY OF MAGNESIUM: CPT

## 2019-03-24 PROCEDURE — 80048 BASIC METABOLIC PNL TOTAL CA: CPT

## 2019-03-24 PROCEDURE — 11000001 HC ACUTE MED/SURG PRIVATE ROOM

## 2019-03-24 PROCEDURE — 94664 DEMO&/EVAL PT USE INHALER: CPT

## 2019-03-24 PROCEDURE — 83540 ASSAY OF IRON: CPT

## 2019-03-24 PROCEDURE — 63600175 PHARM REV CODE 636 W HCPCS: Performed by: INTERNAL MEDICINE

## 2019-03-24 PROCEDURE — 85045 AUTOMATED RETICULOCYTE COUNT: CPT

## 2019-03-24 PROCEDURE — 36415 COLL VENOUS BLD VENIPUNCTURE: CPT

## 2019-03-24 PROCEDURE — 94761 N-INVAS EAR/PLS OXIMETRY MLT: CPT

## 2019-03-24 PROCEDURE — 94799 UNLISTED PULMONARY SVC/PX: CPT

## 2019-03-24 PROCEDURE — 82728 ASSAY OF FERRITIN: CPT

## 2019-03-24 PROCEDURE — 85025 COMPLETE CBC W/AUTO DIFF WBC: CPT

## 2019-03-24 PROCEDURE — 99233 PR SUBSEQUENT HOSPITAL CARE,LEVL III: ICD-10-PCS | Mod: ,,, | Performed by: INTERNAL MEDICINE

## 2019-03-24 PROCEDURE — 99900035 HC TECH TIME PER 15 MIN (STAT)

## 2019-03-24 PROCEDURE — 82550 ASSAY OF CK (CPK): CPT

## 2019-03-24 RX ORDER — LEVOTHYROXINE SODIUM ANHYDROUS 100 UG/5ML
40 INJECTION, POWDER, LYOPHILIZED, FOR SOLUTION INTRAVENOUS ONCE
Status: COMPLETED | OUTPATIENT
Start: 2019-03-24 | End: 2019-03-24

## 2019-03-24 RX ORDER — LEVOTHYROXINE SODIUM ANHYDROUS 100 UG/5ML
120 INJECTION, POWDER, LYOPHILIZED, FOR SOLUTION INTRAVENOUS DAILY
Status: COMPLETED | OUTPATIENT
Start: 2019-03-25 | End: 2019-03-26

## 2019-03-24 RX ORDER — LEVOTHYROXINE SODIUM ANHYDROUS 100 UG/5ML
87.5 INJECTION, POWDER, LYOPHILIZED, FOR SOLUTION INTRAVENOUS DAILY
Status: DISCONTINUED | OUTPATIENT
Start: 2019-03-24 | End: 2019-03-24

## 2019-03-24 RX ORDER — LEVOTHYROXINE SODIUM 75 UG/1
150 TABLET ORAL
Status: DISCONTINUED | OUTPATIENT
Start: 2019-03-27 | End: 2019-03-29 | Stop reason: HOSPADM

## 2019-03-24 RX ORDER — LIOTHYRONINE SODIUM 5 UG/1
5 TABLET ORAL 2 TIMES DAILY
Status: COMPLETED | OUTPATIENT
Start: 2019-03-24 | End: 2019-03-26

## 2019-03-24 RX ADMIN — SODIUM CHLORIDE: 0.9 INJECTION, SOLUTION INTRAVENOUS at 05:03

## 2019-03-24 RX ADMIN — HYDROCORTISONE SODIUM SUCCINATE 100 MG: 100 INJECTION, POWDER, FOR SOLUTION INTRAMUSCULAR; INTRAVENOUS at 06:03

## 2019-03-24 RX ADMIN — LIOTHYRONINE SODIUM 5 MCG: 5 TABLET ORAL at 09:03

## 2019-03-24 RX ADMIN — SODIUM CHLORIDE: 0.9 INJECTION, SOLUTION INTRAVENOUS at 01:03

## 2019-03-24 RX ADMIN — PIPERACILLIN AND TAZOBACTAM 4.5 G: 4; .5 INJECTION, POWDER, LYOPHILIZED, FOR SOLUTION INTRAVENOUS; PARENTERAL at 02:03

## 2019-03-24 RX ADMIN — PIPERACILLIN AND TAZOBACTAM 4.5 G: 4; .5 INJECTION, POWDER, LYOPHILIZED, FOR SOLUTION INTRAVENOUS; PARENTERAL at 09:03

## 2019-03-24 RX ADMIN — LEVOTHYROXINE SODIUM ANHYDROUS 87.5 MCG: 100 INJECTION, POWDER, LYOPHILIZED, FOR SOLUTION INTRAVENOUS at 06:03

## 2019-03-24 RX ADMIN — LEVOTHYROXINE SODIUM ANHYDROUS 40 MCG: 100 INJECTION, POWDER, LYOPHILIZED, FOR SOLUTION INTRAVENOUS at 10:03

## 2019-03-24 RX ADMIN — SODIUM CHLORIDE: 0.9 INJECTION, SOLUTION INTRAVENOUS at 10:03

## 2019-03-24 RX ADMIN — LIOTHYRONINE SODIUM 5 MCG: 5 TABLET ORAL at 10:03

## 2019-03-24 NOTE — ASSESSMENT & PLAN NOTE
- Acute respiratory failure, suspect in the setting of myxedema coma vs. carbon monoxide poisoning vs. sepsis. CXR with some atelectasis bilateral bases, also noted on CT.  - Lower suspicion for significant infectious process; likely discontinue piperacillin-tazobactam 4.5g IV q8hr.  - Incentive spirometry / aerobika q4hr for atelectasis.  - Weaned off of NRB evening of 03/23 for potential carbon monoxide poisoning.

## 2019-03-24 NOTE — SUBJECTIVE & OBJECTIVE
"Interval History: Worsened altered mental status overnight. Received haloperidol overnight for delirium and had pulled NGT. Improved mentation this morning.    Review of Systems   Constitutional: Negative for chills and fever.   Respiratory: Negative for cough and shortness of breath.    Cardiovascular: Negative for chest pain and palpitations.   Gastrointestinal: Negative for abdominal pain, nausea and vomiting.     Objective:     Vital Signs (Most Recent):  Temp: 98.6 °F (37 °C) (03/24/19 0715)  Pulse: 68 (03/24/19 1000)  Resp: 12 (03/24/19 1000)  BP: 137/62 (03/24/19 1000)  SpO2: 97 % (03/24/19 1000) Vital Signs (24h Range):  Temp:  [96.2 °F (35.7 °C)-100 °F (37.8 °C)] 98.6 °F (37 °C)  Pulse:  [] 68  Resp:  [12-22] 12  SpO2:  [91 %-100 %] 97 %  BP: (100-142)/(57-80) 137/62     Weight: 97.8 kg (215 lb 9.8 oz)  Body mass index is 32.78 kg/m².    Intake/Output Summary (Last 24 hours) at 3/24/2019 1021  Last data filed at 3/24/2019 0900  Gross per 24 hour   Intake 2774.33 ml   Output 1575 ml   Net 1199.33 ml      Physical Exam   Constitutional: He appears well-developed and well-nourished. No distress.   HENT:   Head: Normocephalic and atraumatic.   Eyes: Conjunctivae and EOM are normal.   Cardiovascular: Normal rate, regular rhythm, S1 normal, S2 normal and intact distal pulses.   Pulmonary/Chest: Effort normal and breath sounds normal.   Abdominal: Soft. He exhibits no distension. There is no tenderness.   Musculoskeletal: Normal range of motion. He exhibits no edema.   Neurological:   Drowsy, but responsive to questions with some slowed speech. Attention and concentration ~10sec. Oriented x 3 (self, "hospital," "2019")   Skin: Skin is warm and dry.   Nursing note and vitals reviewed.    Significant Labs:   CBC:  Recent Labs   Lab 03/23/19  0845 03/23/19  0935 03/23/19  1126 03/24/19  0349   WBC 18.00*  --   --  10.51   HGB 13.2*  --   --  10.6*   HCT 38.0* 32* 29* 32.2*     --   --  176   GRAN 67.8  " 12.2*  --   --  85.0*  8.9*   LYMPH 26.0  4.7  --   --  12.0*  1.3   MONO 4.3  0.8  --   --  2.6*  0.3   EOS 0.2  --   --  0.0   BASO 0.10  --   --  0.01      CMP:  Recent Labs   Lab 03/23/19  0845 03/23/19  1518 03/23/19 2013 03/24/19  0349    141 139 137   K 2.4* 4.2 4.1 3.7   CL 99* 108 108 105   CO2 20* 25 22* 24   BUN 19 16 14 11   CREATININE 1.6* 1.3 1.3 1.1   * 80 87 100   CALCIUM 9.2 8.9 9.0 9.1   MG  --  2.0  --  2.0   PHOS  --  4.0  --  3.1   ALKPHOS 44  --   --  42*   *  --   --  225*   *  --   --  167*   BILITOT 0.6  --   --  0.4   PROT 7.9  --   --  7.1   ALBUMIN 4.4  --   --  4.0   ANIONGAP 19* 8 9 8     Recent Labs   Lab 03/23/19  0915 03/23/19  1335 03/23/19  1832   TSH >100.00*  --   --    FREET4 <0.25*  --   --    CORTISOL  --  23.80  --    CPK 92571*  --   --    LACTATE 6.6* 3.8* 3.0*     Significant Imaging:   No new imaging this morning.

## 2019-03-24 NOTE — PROGRESS NOTES
[facility] Pulmonology/Critical Care Progress Note    Subjective:      Did well overnight being transitioned to RA.  No complaints this morning, though is a little confused     Objective:   Last 24 Hour Vital Signs:  BP  Min: 106/69  Max: 142/76  Temp  Av.3 °F (37.4 °C)  Min: 98.6 °F (37 °C)  Max: 100 °F (37.8 °C)  Pulse  Av  Min: 68  Max: 112  Resp  Avg: 15.8  Min: 10  Max: 22  SpO2  Av.4 %  Min: 94 %  Max: 100 %  Weight  Av.8 kg (215 lb 9.8 oz)  Min: 97.8 kg (215 lb 9.8 oz)  Max: 97.8 kg (215 lb 9.8 oz)  I/O last 3 completed shifts:  In: 2774.3 [I.V.:2524.3; NG/GT:50; IV Piggyback:200]  Out: 1325 [Urine:1325]    Physical Examination:  General: Alert and awake in NAD.  obese  HENT:  NCAT; anicteric sclera with EOMi; OP clear with MMM  Cardio:  Regular rate and rhythm with normal S1 and S2; no murmurs  Resp:  CTAB with normal effort; no wheezes, crackles or rhonchi  Abdom: Soft, NTND with normoactive bowel sounds  Extrem: Warm and dry.  No edema  Pulses: 2+ and symmetric distally  Neuro:  Alert and awake but only oriented to person and place. Responses slowed but appropriate. Pleasant with no focal deficits      Laboratory:  Laboratory Data Reviewed: yes  Pertinent Findings:  Lab Results   Component Value Date    WBC 10.51 2019    HGB 10.6 (L) 2019    HCT 32.2 (L) 2019    MCV 91 2019     2019     Lab Results   Component Value Date    CREATININE 1.1 2019    BUN 11 2019     2019    K 3.7 2019     2019    CO2 24 2019     Lab Results   Component Value Date     (H) 2019     (H) 2019    ALKPHOS 42 (L) 2019    BILITOT 0.4 2019       Microbiology Data Reviewed: yes  Pertinent Findings:  3/24 Blood Cx -- NGTD  3/24 Resp Cx -- normal humaira    Radiology Data Reviewed: yes  Pertinent Findings:  No recent imaging      Assessment:     Dorian Murray is a 29 y.o.male with hx  hypothyroidism, not currently on medication presenting after being found down in his care unconscious.      Plan:     NEURO:  - CT head neg. Awake and alert with some confusion and general slowing  - Appropriate responses.  Following commands.  No deficits     CARDIO:  - Remains hemodynamically stable.  No hx known cardiac disease  - LA and CPK improving.  Continue resuscitation but caution to avoid excessive IVF    RESP:  - CT with BLL dense consolidations, poss atelectasis vs aspiration  - No response to Narcan.  Tox neg. Now extubated to NRB.    - COHb elevated at OSH.  Repeat normal.  Now on RA doing well     FEN/GI:  - CTAP neg.  CPK elevated.  IVF as noted above. Advance diet as tolerated     RENAL:  - initial KLAUDIA rapidly resolved.  IVF as noted above. Avoid nephrotoxins/NSAIDS     HEME/ID:  - Hbg stable.  No active bleeding. Afebrile with elevated WBC  - Low suspicion for septic/infectious etiology.  ABx now D/C'd    ENDO:  - hx thyroid disease. Non-adherent to meds. TSH >100 and fT4 low.  - This is likely etiology of decompensation and acute illness  - Agree with initiation of IV Synthroid     VTE PPx:  LMWH     Disposition:  Remains stable for transfer to the floor      Signing off. Thank you for the consult. Please feel free to contact us with any questions or concerns regarding the care of this patient.  .  Crow Figueroa MD  [facility] Pulmonology/Critical Care, -IV

## 2019-03-24 NOTE — NURSING
Pt received from ICU. No acute distress noted. VSS on RA. Assessment complete. VSS on RA.  Bed lowered and locked, side rails upx3, call light within reach. Will continue to monitor for changes.

## 2019-03-24 NOTE — NURSING
Patient remained disoriented to location, situation, and time. Pt speech has improved; pt is able to state the president and full name. Pt became combative throughout the night, requiring restraints and haldol. VSS, currently on room air at 97%. Gao remains in place with adequate urine output. All safety precautions maintained. RN will continue to monitor.

## 2019-03-24 NOTE — PT/OT/SLP PROGRESS
"Patient up to bathroom with 1 assist and walker, patient continually needs to be encouraged to place left foot flat on the floor as she tends to walk on the outside of this foot and it throws off her balance for walking, patient states that it does not hurt as much when she walks this way but discussed with her that it makes her greater risk to fall , will continue to remind. Patient refuses to sit up in chair for meals as she can only tolerate sitting for 20 minutes at a time which she relates to her \"lumbar puncture\" states when she had it previously she could not sit for \">2 months due to pain.\"  " Occupational Therapy  Not Seen Note    Patient Name:  Dorian Murray   MRN:  [id number]    Two attempts made to see the patient for OT evaluation today.  On the first attempt the patient not seen today secondary to Unavailable (pt being seen by Case Management). On the second attempt (1415) the patient was not available due to transport from ICU to telemetry 3CC..  Will follow-up Monday, 3/25/19.  BETTY Tobin  3/24/2019

## 2019-03-24 NOTE — EICU
eICU Note :    Called by the [facility]:    Problem: agitation In the setting of Hypothyroidism     Pertinent History and labs reviewed :  Problem List:  : Acute respiratory failure with hypoxia  : Transaminitis  : KLAUDIA (acute kidney injury)  : Encephalopathy, metabolic  : Severe sepsis  : Hypokalemia  : Hashimoto's thyroiditis  : Toxic effect of carbon monoxide, unintentional  : Non-traumatic rhabdomyolysis    Treatment /Intervention given: Bilateral mittens, Haldol 1mg IVPx1, 12 lead EKG for QTc (480msec)  Stacy Silveira M.D  eICU Physician  5:55 AM  NG-tube pulled out by the patient, Your thyroxine via NG tube switched to IV levothyroxine 87.5 mcg IVP daily

## 2019-03-24 NOTE — PLAN OF CARE
Problem: Adult Inpatient Plan of Care  Goal: Plan of Care Review  Outcome: Ongoing (interventions implemented as appropriate)  Pt received on room air with adequate saturation.   1300: Pt instructed on IS and Aerobika.

## 2019-03-24 NOTE — ASSESSMENT & PLAN NOTE
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  - TSH >100, FT4 < 0.25 on initial presentation; cortisol 23.8. Received levothyroxine 100mcg IV and 175mcg per NG 03/23.  - Discussed with endocrinology via transfer center; continue levothyroxine at 120mcg IV with liothyronine 5mcg PO BID for several days, then transition to levothyroxine 150mcg PO daily.  - Repeat TSH, FT4 in several days to ensure improvement. Will need outpatient endocrinology follow-up.  - Discontinued hydrocortisone given cortisol level stable.

## 2019-03-24 NOTE — PLAN OF CARE
Problem: Adult Inpatient Plan of Care  Goal: Plan of Care Review  Outcome: Ongoing (interventions implemented as appropriate)  Patient received on NRB.  Sats 99 to 100%. Pt placed on 3L nc @ 2106. Pt then became agitated and would not keep nc in nose. On RA pt sats were  94 to 99% Pt. Is restrained, will continue to monitor.

## 2019-03-24 NOTE — PROGRESS NOTES
[facility] Hospital Medicine  Progress Note    Patient Name: Dorian Murray  MRN: [id number]  Patient Class: IP- Inpatient   Admission Date: 3/23/2019  Length of Stay: 1 days  Attending Physician: ENRRIQUE Villagran MD  Primary Care Provider: Primary Doctor No    Subjective:     Principal Problem:Myxedema coma    HPI:  Mr. Murray is a 29/M with reported PMH of Hashimoto's who presented to [facility] ED via EMS after having been found altered on the morning of 03/23. History obtained from the patient's mother as well as ED notes. Reportedly he was found down in his car (2000 [employer]) outside a gas station; had been noted on security cameras at gas station around 0345. Supposedly was last seen normal by his ex-wife the evening of 03/22 when he took his daughter to dinner. EMS arrived after being notified by gas station personnel after his car was stationary and still running. EMS arrived and bagged the patient, and he was transported to [facility] ED. He was subsequently intubated for airway protection and hypoxic respiratory failure. Reportedly, his carboxyhemoglobin on ABG was measured at 7.2.    Patient's mother reports that he has a history of Hashimoto's; she has thyroid issues as well. Supposedly he had been on thyroid medication in the remote past, but she believes he has not been taking his medication for a significant period of time.    Hospital Course:  After admission, Mr. Patiño was noted to have improving mental status and was able to be extubated. He was noted to have continued altered mental status; TSH and free T4 resulted showing significantly elevated TSH and very low T4. He was subsequently started on levothyroxine and hydrocortisone for potential adrenal insufficiency with concern for myxedema coma.    Interval History: Worsened altered mental status overnight. Received haloperidol overnight for delirium and had pulled NGT. Improved mentation this  "morning.    Review of Systems   Constitutional: Negative for chills and fever.   Respiratory: Negative for cough and shortness of breath.    Cardiovascular: Negative for chest pain and palpitations.   Gastrointestinal: Negative for abdominal pain, nausea and vomiting.     Objective:     Vital Signs (Most Recent):  Temp: 98.6 °F (37 °C) (03/24/19 0715)  Pulse: 68 (03/24/19 1000)  Resp: 12 (03/24/19 1000)  BP: 137/62 (03/24/19 1000)  SpO2: 97 % (03/24/19 1000) Vital Signs (24h Range):  Temp:  [96.2 °F (35.7 °C)-100 °F (37.8 °C)] 98.6 °F (37 °C)  Pulse:  [] 68  Resp:  [12-22] 12  SpO2:  [91 %-100 %] 97 %  BP: (100-142)/(57-80) 137/62     Weight: 97.8 kg (215 lb 9.8 oz)  Body mass index is 32.78 kg/m².    Intake/Output Summary (Last 24 hours) at 3/24/2019 1021  Last data filed at 3/24/2019 0900  Gross per 24 hour   Intake 2774.33 ml   Output 1575 ml   Net 1199.33 ml      Physical Exam   Constitutional: He appears well-developed and well-nourished. No distress.   HENT:   Head: Normocephalic and atraumatic.   Eyes: Conjunctivae and EOM are normal.   Cardiovascular: Normal rate, regular rhythm, S1 normal, S2 normal and intact distal pulses.   Pulmonary/Chest: Effort normal and breath sounds normal.   Abdominal: Soft. He exhibits no distension. There is no tenderness.   Musculoskeletal: Normal range of motion. He exhibits no edema.   Neurological:   Drowsy, but responsive to questions with some slowed speech. Attention and concentration ~10sec. Oriented x 3 (self, "hospital," "2019")   Skin: Skin is warm and dry.   Nursing note and vitals reviewed.    Significant Labs:   CBC:  Recent Labs   Lab 03/23/19  0845 03/23/19  0935 03/23/19  1126 03/24/19  0349   WBC 18.00*  --   --  10.51   HGB 13.2*  --   --  10.6*   HCT 38.0* 32* 29* 32.2*     --   --  176   GRAN 67.8  12.2*  --   --  85.0*  8.9*   LYMPH 26.0  4.7  --   --  12.0*  1.3   MONO 4.3  0.8  --   --  2.6*  0.3   EOS 0.2  --   --  0.0   BASO 0.10  --  "  --  0.01      CMP:  Recent Labs   Lab 03/23/19  0845 03/23/19  1518 03/23/19 2013 03/24/19  0349    141 139 137   K 2.4* 4.2 4.1 3.7   CL 99* 108 108 105   CO2 20* 25 22* 24   BUN 19 16 14 11   CREATININE 1.6* 1.3 1.3 1.1   * 80 87 100   CALCIUM 9.2 8.9 9.0 9.1   MG  --  2.0  --  2.0   PHOS  --  4.0  --  3.1   ALKPHOS 44  --   --  42*   *  --   --  225*   *  --   --  167*   BILITOT 0.6  --   --  0.4   PROT 7.9  --   --  7.1   ALBUMIN 4.4  --   --  4.0   ANIONGAP 19* 8 9 8     Recent Labs   Lab 03/23/19  0915 03/23/19  1335 03/23/19  1832   TSH >100.00*  --   --    FREET4 <0.25*  --   --    CORTISOL  --  23.80  --    CPK 47542*  --   --    LACTATE 6.6* 3.8* 3.0*     Significant Imaging:   No new imaging this morning.    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  - TSH >100, FT4 < 0.25 on initial presentation; cortisol 23.8. Received levothyroxine 100mcg IV and 175mcg per NG 03/23.  - Discussed with endocrinology via transfer center; continue levothyroxine at 120mcg IV with liothyronine 5mcg PO BID for several days, then transition to levothyroxine 150mcg PO daily.  - Repeat TSH, FT4 in several days to ensure improvement. Will need outpatient endocrinology follow-up.  - Discontinued hydrocortisone given cortisol level stable.    Anemia  - Mild normocytic anemia with underlying thyroid disease.  - Check iron studies, retic count, B12/folate.    Non-traumatic rhabdomyolysis  - Initial ,000s; trend daily.  - Continuing IVFs with NS at 200mL/hr.    Toxic effect of carbon monoxide, unintentional  - As under respiratory failure.    Hashimoto's thyroiditis  - Reported history of Hashimoto's  without treatment; TSH noted to be significantly elevated. FT4 pending. May give IV T4 pending results.    Hypokalemia  - Resolved.    KLAUDIA (acute kidney injury)  - Likely secondary to myxedema coma / rhabdomyolysis.  - Continuing IVFs as under rhabdo.    Transaminitis  - Likely secondary to rhabdomyolysis.  - Continuing to monitor.    Acute respiratory failure with hypoxia  - Acute respiratory failure, suspect in the setting of myxedema coma vs. carbon monoxide poisoning vs. sepsis. CXR with some atelectasis bilateral bases, also noted on CT.  - Lower suspicion for significant infectious process; likely discontinue piperacillin-tazobactam 4.5g IV q8hr.  - Incentive spirometry / aerobika q4hr for atelectasis.  - Weaned off of NRB evening of 03/23 for potential carbon monoxide poisoning.    VTE Risk Mitigation (From admission, onward)        Ordered     IP VTE LOW RISK PATIENT  Once      03/23/19 1228     Place sequential compression device  Until discontinued      03/23/19 1228        Critical care time spent on the evaluation and treatment of severe organ dysfunction, review of pertinent labs and imaging studies, discussions with consulting providers and discussions with patient/family: 35 minutes.    PAPI Villagran MD  Department of Hospital Medicine   [facility]

## 2019-03-24 NOTE — HOSPITAL COURSE
After admission, Mr. Patiño was noted to have improving mental status and was able to be extubated. He was noted to have continued altered mental status; TSH and free T4 resulted showing significantly elevated TSH and very low T4. He was subsequently started on levothyroxine, cytomel and hydrocortisone for potential adrenal insufficiency with concern for myxedema coma. Hydrocortisone was stopped after cortisol came back normal. Mental status improved slowly but steadily. He was evaluated by PT/OT/ST. PT/OT recommended IPR so SW was consulted. ST noted visual disturbances so further imaging was done with MRI, which showed pituitary hyperplasia. Discussed with Endocrinology at [facility], Dr. Paul and this is likely due to myxedema coma and severely uncontrolled hypothyroidism. He continues to improve and is now stable for discharge to rehab.

## 2019-03-24 NOTE — PLAN OF CARE
Problem: Adult Inpatient Plan of Care  Goal: Plan of Care Review  Outcome: Ongoing (interventions implemented as appropriate)  POC reviewed with Pt and ex wife. No significant events this shift. VSS on RA. Cardiac monitor maintained.No complaints of pain. Pt voids per la catheter with weight shift assitance provided as needed. Bed lowered and locked, side rails up x3, call light within reach. Will continue to monitor.

## 2019-03-24 NOTE — ASSESSMENT & PLAN NOTE
- Mild normocytic anemia with underlying thyroid disease.  - Check iron studies, retic count, B12/folate.

## 2019-03-25 LAB
ALBUMIN SERPL BCP-MCNC: 3.6 G/DL (ref 3.5–5.2)
ALP SERPL-CCNC: 43 U/L (ref 55–135)
ALT SERPL W/O P-5'-P-CCNC: 230 U/L (ref 10–44)
ANION GAP SERPL CALC-SCNC: 7 MMOL/L (ref 8–16)
AST SERPL-CCNC: 269 U/L (ref 10–40)
BASOPHILS # BLD AUTO: 0.02 K/UL (ref 0–0.2)
BASOPHILS NFR BLD: 0.3 % (ref 0–1.9)
BILIRUB SERPL-MCNC: 0.4 MG/DL (ref 0.1–1)
BUN SERPL-MCNC: 12 MG/DL (ref 6–20)
CALCIUM SERPL-MCNC: 8.5 MG/DL (ref 8.7–10.5)
CHLORIDE SERPL-SCNC: 110 MMOL/L (ref 95–110)
CK SERPL-CCNC: 5817 U/L (ref 20–200)
CO2 SERPL-SCNC: 25 MMOL/L (ref 23–29)
CREAT SERPL-MCNC: 1.1 MG/DL (ref 0.5–1.4)
DIFFERENTIAL METHOD: ABNORMAL
EOSINOPHIL # BLD AUTO: 0 K/UL (ref 0–0.5)
EOSINOPHIL NFR BLD: 0.5 % (ref 0–8)
ERYTHROCYTE [DISTWIDTH] IN BLOOD BY AUTOMATED COUNT: 14.9 % (ref 11.5–14.5)
EST. GFR  (AFRICAN AMERICAN): >60 ML/MIN/1.73 M^2
EST. GFR  (NON AFRICAN AMERICAN): >60 ML/MIN/1.73 M^2
FERRITIN SERPL-MCNC: 175 NG/ML (ref 20–300)
FOLATE SERPL-MCNC: 10.7 NG/ML (ref 4–24)
GLUCOSE SERPL-MCNC: 79 MG/DL (ref 70–110)
HCT VFR BLD AUTO: 29.1 % (ref 40–54)
HGB BLD-MCNC: 9.3 G/DL (ref 14–18)
LYMPHOCYTES # BLD AUTO: 2.3 K/UL (ref 1–4.8)
LYMPHOCYTES NFR BLD: 36.2 % (ref 18–48)
MAGNESIUM SERPL-MCNC: 2.4 MG/DL (ref 1.6–2.6)
MCH RBC QN AUTO: 29.1 PG (ref 27–31)
MCHC RBC AUTO-ENTMCNC: 32 G/DL (ref 32–36)
MCV RBC AUTO: 91 FL (ref 82–98)
MONOCYTES # BLD AUTO: 0.4 K/UL (ref 0.3–1)
MONOCYTES NFR BLD: 6.3 % (ref 4–15)
NEUTROPHILS # BLD AUTO: 3.6 K/UL (ref 1.8–7.7)
NEUTROPHILS NFR BLD: 56.4 % (ref 38–73)
PHOSPHATE SERPL-MCNC: 2.8 MG/DL (ref 2.7–4.5)
PLATELET # BLD AUTO: 175 K/UL (ref 150–350)
PMV BLD AUTO: 9.6 FL (ref 9.2–12.9)
POTASSIUM SERPL-SCNC: 2.9 MMOL/L (ref 3.5–5.1)
PROT SERPL-MCNC: 6.3 G/DL (ref 6–8.4)
RBC # BLD AUTO: 3.2 M/UL (ref 4.6–6.2)
SODIUM SERPL-SCNC: 142 MMOL/L (ref 136–145)
VIT B12 SERPL-MCNC: 730 PG/ML (ref 210–950)
WBC # BLD AUTO: 6.3 K/UL (ref 3.9–12.7)

## 2019-03-25 PROCEDURE — 94664 DEMO&/EVAL PT USE INHALER: CPT

## 2019-03-25 PROCEDURE — 94761 N-INVAS EAR/PLS OXIMETRY MLT: CPT

## 2019-03-25 PROCEDURE — 97166 OT EVAL MOD COMPLEX 45 MIN: CPT

## 2019-03-25 PROCEDURE — 84100 ASSAY OF PHOSPHORUS: CPT

## 2019-03-25 PROCEDURE — 11000001 HC ACUTE MED/SURG PRIVATE ROOM

## 2019-03-25 PROCEDURE — 85025 COMPLETE CBC W/AUTO DIFF WBC: CPT

## 2019-03-25 PROCEDURE — 99233 SBSQ HOSP IP/OBS HIGH 50: CPT | Mod: ,,, | Performed by: INTERNAL MEDICINE

## 2019-03-25 PROCEDURE — 99233 PR SUBSEQUENT HOSPITAL CARE,LEVL III: ICD-10-PCS | Mod: ,,, | Performed by: INTERNAL MEDICINE

## 2019-03-25 PROCEDURE — 83735 ASSAY OF MAGNESIUM: CPT

## 2019-03-25 PROCEDURE — 97116 GAIT TRAINING THERAPY: CPT

## 2019-03-25 PROCEDURE — 82550 ASSAY OF CK (CPK): CPT

## 2019-03-25 PROCEDURE — 94799 UNLISTED PULMONARY SVC/PX: CPT

## 2019-03-25 PROCEDURE — 25000003 PHARM REV CODE 250: Performed by: INTERNAL MEDICINE

## 2019-03-25 PROCEDURE — 80053 COMPREHEN METABOLIC PANEL: CPT

## 2019-03-25 PROCEDURE — 99900035 HC TECH TIME PER 15 MIN (STAT)

## 2019-03-25 PROCEDURE — 97535 SELF CARE MNGMENT TRAINING: CPT

## 2019-03-25 PROCEDURE — 36415 COLL VENOUS BLD VENIPUNCTURE: CPT

## 2019-03-25 PROCEDURE — 97162 PT EVAL MOD COMPLEX 30 MIN: CPT

## 2019-03-25 RX ORDER — SODIUM CHLORIDE 9 MG/ML
INJECTION, SOLUTION INTRAVENOUS CONTINUOUS
Status: ACTIVE | OUTPATIENT
Start: 2019-03-25 | End: 2019-03-26

## 2019-03-25 RX ORDER — POTASSIUM CHLORIDE 20 MEQ/15ML
40 SOLUTION ORAL
Status: ACTIVE | OUTPATIENT
Start: 2019-03-25 | End: 2019-03-25

## 2019-03-25 RX ADMIN — LEVOTHYROXINE SODIUM ANHYDROUS 120 MCG: 100 INJECTION, POWDER, LYOPHILIZED, FOR SOLUTION INTRAVENOUS at 08:03

## 2019-03-25 RX ADMIN — LIOTHYRONINE SODIUM 5 MCG: 5 TABLET ORAL at 10:03

## 2019-03-25 RX ADMIN — LIOTHYRONINE SODIUM 5 MCG: 5 TABLET ORAL at 08:03

## 2019-03-25 RX ADMIN — SODIUM CHLORIDE: 0.9 INJECTION, SOLUTION INTRAVENOUS at 10:03

## 2019-03-25 RX ADMIN — SODIUM CHLORIDE: 0.9 INJECTION, SOLUTION INTRAVENOUS at 06:03

## 2019-03-25 NOTE — PLAN OF CARE
Problem: Adult Inpatient Plan of Care  Goal: Plan of Care Review  Outcome: Ongoing (interventions implemented as appropriate)  POC reviewed with Pt and ex wife. No significant events this shift. VSS on RA. Cardiac monitor maintained.No complaints of pain. Pt voids per condom catheter with weight shift assitance provided as needed. Bed lowered and locked, side rails up x3, call light within reach. Will continue to monitor.

## 2019-03-25 NOTE — PT/OT/SLP EVAL
Physical Therapy Evaluation and Treatment    Patient Name:  Dorian Murray   MRN:  [id number]    Recommendations:     Discharge Recommendations:  (TBD pending progress, may require continued therapy in facility)   Discharge Equipment Recommendations: (TBD)   Barriers to discharge: Decreased caregiver support and previously living in car    Assessment:     Dorian Murray is a 29 y.o. male admitted with a medical diagnosis of Myxedema coma with acute respiratory failure, non-traumatic rhabdomyolysis, and KLAUDIA. Pmhx significant for Hashimoto's thyroiditis.  He presents with the following impairments/functional limitations:  weakness, impaired endurance, impaired self care skills, impaired functional mobilty, gait instability, impaired balance, impaired cognition, decreased upper extremity function, decreased lower extremity function, decreased safety awareness, decreased coordination, edema.    Patient presents to therapy as pleasant gentleman who endorses feeling depressed and becomes tearful at multiple points during evaluation, specifically after attempting ambulation and when talking about his daughter. Patient moves slowly for all functional mobility with grossly impaired coordination with weakness notable thru-out in RLE and in bilateral hip flexion. At this time continues to have impaired cognition as is unable to recall date or specific location after 5 min. D/C rec TBD pending progress, anticipating need for post acute therapy in facility.     Rehab Prognosis: Good; patient would benefit from acute skilled PT services to address these deficits and reach maximum level of function.    Recent Surgery: * No surgery found *      Plan:     During this hospitalization, patient to be seen 6 x/week to address the identified rehab impairments via gait training, therapeutic activities, therapeutic exercises, neuromuscular re-education and progress toward the following goals:    · Plan of Care  "Expires:  19    Subjective     Chief Complaint: Anything hurt? "My pride"  Patient/Family Comments/goals: To see his daughter and to get better  Pain/Comfort:  · Pain Rating 1: 0/10("My pride hurts")  · Pain Rating Post-Intervention 1: 0/10    Patients cultural, spiritual, Taoist conflicts given the current situation: no    Living Environment:  Patient was residing in his car prior to admission and when asked where he would be residing after d/c, pt replies "ex-wife's house is out of the question." His parents reside in [facility].    Prior to admission, patients level of function was independent with ambulation and ADLs. He reports being employed at [employer] in [place] and enjoys working on cars.  Equipment used at home: none.  DME owned (not currently used): none.  Upon discharge, patient will have assistance from no one if d/c to previous living situation.    Objective:     Communicated with Marya Parekh prior to session.  Patient found HOB elevated with telemetry, peripheral IV, la catheter  upon PT entry to room. Patient agreeable to evaluation.    General Precautions: Standard, (fall risk)   Orthopedic Precautions:N/A   Braces: N/A     Patient donned yellow socks and gait belt for OOB mobility.    Exams:  · Cognition:   · Patient is oriented to person, , year (not month or date), general location (hospital).  · Pt follows approximately 100% of one step commands.    · Mood: Pleasant and cooperative, becomes tearful at multiple points during exam. Verbal reassurances and encouragement provided.   · Patient endorses feeling depressed.  · Musculoskeletal:  · Well developed musculature, pt previously body-builder  · Posture:    · Posterior pelvic tilt  · Forward head  · LE ROM/Strength:   · R ROM: WFL  · L ROM: WFL  · R Strength:   · Hip flexion: 3/5  · Knee ext: 4/5  · PF/DF: 4-/5   · L Strength:   · Hip flexion: 4/5  · Knee ext: 5/5  · DF/PF: 5/5  · Neuromuscular:  · Sensation: Intact to light " "touch bilateral LEs.   · Tone/Reflexes: No impairments identified with functional mobility. No formal testing performed.   · Coordination: Slow performance of all mvmt, requires VCs for use of UE to assist/hand placement on bed  · Balance:   · Normal stance, eyes open: Maintains 15-20 sec, moderate sway to anterior and posterior with minimal postural reactions (Marie to return to EDMUNDO), without Marie pt reaches out for RW for UE support to maintain balance  · Narrow stance, eyes open: Instructed to place feet together, pt moves feet ~1/2" and maintains feet apart  · Visual-vestibular: No impairments identified with functional mobility. No formal testing performed.  · Integument: Visible skin intact  · Cardiopulmonary:  · Vital signs: On room air  · Pre (supine): /75 HR 72 SpO2 95%  · During (sitting): /72 HR 81 SpO2 96%  · Edema: Notable to (B) hands and (B) feet, no pitting edema noted to shanks      Functional Mobility:  · Bed Mobility:     · Bridging: stand by assistance, verbal and visual cues for use of hospital bed features for pt to perform scooting to HOB without assistance   · Supine to Sit: minimum assistance, slow performance with HOB elevated and use of bed rails  · Sit to Supine: minimum assistance, slow performance with HOB flat  · Transfers:     · Sit to Stand: 2x, minimum assistance with rolling walker, slow perfomance  · Gait: x2 ft forwards and backwards with RW and Marie-CGA.   · Pt with decreased stride length bilaterally and increased double stance support time. Notably increased step width.   · Balance:   · See above for standing balance.   · Sitting balance: Fair, use of BUE to maintain sitting balance during MMT of LE      Therapeutic Activities and Exercises:   Supine<>sit, sitting balance, sit<>stand, gait, standing balance without RW    PT educated patient re: PT plan of care, role of PT, safety with OOB mobility, use of RW, transfer technique, up to chair with staff assist and RW. "  Pt verbalize understanding.      AM-PAC 6 CLICK MOBILITY  Total Score:17     Patient left HOB elevated with all lines intact, call button in reach, bed alarm on, RN Izabella notified and white board updated with PT rec for up to chair with assist and RW.    GOALS:   Multidisciplinary Problems     Physical Therapy Goals        Problem: Physical Therapy Goal    Goal Priority Disciplines Outcome Goal Variances Interventions   Physical Therapy Goal     PT, PT/OT      Description:  Goals to be met by: 19    Patient will increase functional independence with mobility by performin. Gait x 50 feet with rolling walker or least restrictive assistive device and supervision.  2. Ascend/descend curb step with rolling walker or least restrictive assistive device and supervision.   3. Sit<>stand with rolling walker or least restrictive assistive device and supervision.   4. Supine<>sit without use of hospital bed features and independence.                         History:     Past Medical History:   Diagnosis Date    Hashimoto's thyroiditis        History reviewed. No pertinent surgical history.    Time Tracking:     PT Received On: 19  PT Start Time: 1006     PT Stop Time: 1043  PT Total Time (min): 37 min     7 min Dr. Villagran exam of pt.    Billable Minutes: Evaluation 20 and Gait Training 10      Vickie Hidalgo, PT  2019

## 2019-03-25 NOTE — PROGRESS NOTES
[facility] Hospital Medicine  Progress Note    Patient Name: Dorian Murray  MRN: [id number]  Patient Class: IP- Inpatient   Admission Date: 3/23/2019  Length of Stay: 2 days  Attending Physician: ENRRIQUE Villagran MD  Primary Care Provider: Primary Doctor No        Subjective:     Principal Problem:Myxedema coma    HPI:  Mr. Murray is a 29/M with reported PMH of Hashimoto's who presented to [facility] ED via EMS after having been found altered on the morning of 03/23. History obtained from the patient's mother as well as ED notes. Reportedly he was found down in his car (2000 [employer]) outside a gas station; had been noted on security cameras at gas station around 0345. Supposedly was last seen normal by his ex-wife the evening of 03/22 when he took his daughter to dinner. EMS arrived after being notified by gas station personnel after his car was stationary and still running. EMS arrived and bagged the patient, and he was transported to [facility] ED. He was subsequently intubated for airway protection and hypoxic respiratory failure. Reportedly, his carboxyhemoglobin on ABG was measured at 7.2.    Patient's mother reports that he has a history of Hashimoto's; she has thyroid issues as well. Supposedly he had been on thyroid medication in the remote past, but she believes he has not been taking his medication for a significant period of time.    Hospital Course:  After admission, Mr. Patiño was noted to have improving mental status and was able to be extubated. He was noted to have continued altered mental status; TSH and free T4 resulted showing significantly elevated TSH and very low T4. He was subsequently started on levothyroxine and hydrocortisone for potential adrenal insufficiency with concern for myxedema coma. Mental status improved slowly but steadily.    Interval History: Mental status improved. Feeling better, but still slowed. No new concerns.    Review of  "Systems   Constitutional: Negative for chills and fever.   Respiratory: Negative for cough and shortness of breath.    Cardiovascular: Negative for chest pain and palpitations.   Gastrointestinal: Negative for abdominal pain, nausea and vomiting.     Objective:     Vital Signs (Most Recent):  Temp: 97.5 °F (36.4 °C) (03/25/19 1623)  Pulse: 67 (03/25/19 1623)  Resp: 19 (03/25/19 1623)  BP: 123/76 (03/25/19 1623)  SpO2: (!) 94 % (03/25/19 1623) Vital Signs (24h Range):  Temp:  [97 °F (36.1 °C)-99.4 °F (37.4 °C)] 97.5 °F (36.4 °C)  Pulse:  [56-98] 67  Resp:  [14-19] 19  SpO2:  [93 %-98 %] 94 %  BP: (101-123)/(50-76) 123/76     Weight: 97.8 kg (215 lb 9.8 oz)  Body mass index is 32.78 kg/m².    Intake/Output Summary (Last 24 hours) at 3/25/2019 1646  Last data filed at 3/25/2019 0500  Gross per 24 hour   Intake 2200 ml   Output 1950 ml   Net 250 ml      Physical Exam   Constitutional: He appears well-developed and well-nourished. No distress.   HENT:   Head: Normocephalic and atraumatic.   Eyes: Conjunctivae and EOM are normal.   Cardiovascular: Normal rate, regular rhythm, S1 normal, S2 normal and intact distal pulses.   Pulmonary/Chest: Effort normal and breath sounds normal.   Abdominal: Soft. He exhibits no distension. There is no tenderness.   Musculoskeletal: Normal range of motion. He exhibits no edema.   Neurological:   Drowsy, but responsive to questions with some slowed speech. Attention and concentration ~10sec. Oriented x 3 (self, "hospital," "2019")   Skin: Skin is warm and dry.   Nursing note and vitals reviewed.    Significant Labs:   CBC:  Recent Labs   Lab 03/23/19  0845  03/23/19  1126 03/24/19  0349 03/25/19  0618   WBC 18.00*  --   --  10.51 6.30   HGB 13.2*  --   --  10.6* 9.3*   HCT 38.0*   < > 29* 32.2* 29.1*     --   --  176 175   GRAN 67.8  12.2*  --   --  85.0*  8.9* 56.4  3.6   LYMPH 26.0  4.7  --   --  12.0*  1.3 36.2  2.3   MONO 4.3  0.8  --   --  2.6*  0.3 6.3  0.4   EOS 0.2 "  --   --  0.0 0.0   BASO 0.10  --   --  0.01 0.02    < > = values in this interval not displayed.      CMP:  Recent Labs   Lab 03/23/19  0845 03/23/19  1518 03/23/19  2013 03/24/19  0349 03/25/19  0618    141 139 137 142   K 2.4* 4.2 4.1 3.7 2.9*   CL 99* 108 108 105 110   CO2 20* 25 22* 24 25   BUN 19 16 14 11 12   CREATININE 1.6* 1.3 1.3 1.1 1.1   * 80 87 100 79   CALCIUM 9.2 8.9 9.0 9.1 8.5*   MG  --  2.0  --  2.0 2.4   PHOS  --  4.0  --  3.1 2.8   ALKPHOS 44  --   --  42* 43*   *  --   --  225* 269*   *  --   --  167* 230*   BILITOT 0.6  --   --  0.4 0.4   PROT 7.9  --   --  7.1 6.3   ALBUMIN 4.4  --   --  4.0 3.6   ANIONGAP 19* 8 9 8 7*     Recent Labs   Lab 03/23/19  0915 03/23/19  1335 03/23/19  1832  03/25/19  0618   TSH >100.00*  --   --   --   --    FREET4 <0.25*  --   --   --   --    CORTISOL  --  23.80  --   --   --    CPK 74629*  --   --    < > 5817*   LACTATE 6.6* 3.8* 3.0*  --   --     < > = values in this interval not displayed.     Significant Imaging:   No new imaging this morning.    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  - TSH >100, FT4 < 0.25 on initial presentation; cortisol 23.8. Received levothyroxine 100mcg IV and 175mcg per NG 03/23.  - Discussed with endocrinology via transfer center; continue levothyroxine at 120mcg IV with liothyronine 5mcg PO BID for several days, then transition to levothyroxine 150mcg PO daily.  - Repeat TSH, FT4 in several days to ensure improvement. Will need outpatient endocrinology follow-up.  - Discontinued hydrocortisone given cortisol level stable.    Anemia  - Mild normocytic anemia with underlying thyroid disease.  - Check iron  studies, retic count, B12/folate.    Non-traumatic rhabdomyolysis  - Initial ,000s; trend daily.  - Continuing IVFs with NS at 200mL/hr.    Toxic effect of carbon monoxide, unintentional  - As under respiratory failure.    Hashimoto's thyroiditis  - As under myxedema coma.    Hypokalemia  - Resolved.    KLAUDIA (acute kidney injury)  - Likely secondary to myxedema coma / rhabdomyolysis.  - Continuing IVFs as under rhabdo.    Transaminitis  - Likely secondary to rhabdomyolysis.  - Continuing to monitor.    Acute respiratory failure with hypoxia  - Acute respiratory failure, suspect in the setting of myxedema coma vs. carbon monoxide poisoning vs. sepsis. CXR with some atelectasis bilateral bases, also noted on CT.  - Lower suspicion for significant infectious process; likely discontinue piperacillin-tazobactam 4.5g IV q8hr.  - Incentive spirometry / aerobika q4hr for atelectasis.  - Weaned off of NRB evening of 03/23 for potential carbon monoxide poisoning.    VTE Risk Mitigation (From admission, onward)        Ordered     IP VTE LOW RISK PATIENT  Once      03/23/19 1228     Place sequential compression device  Until discontinued      03/23/19 1228        ENRRIQUE Villanueva MD  Department of Hospital Medicine   [facility]  [phone]

## 2019-03-25 NOTE — SUBJECTIVE & OBJECTIVE
"Interval History: Mental status improved. Feeling better, but still slowed. No new concerns.    Review of Systems   Constitutional: Negative for chills and fever.   Respiratory: Negative for cough and shortness of breath.    Cardiovascular: Negative for chest pain and palpitations.   Gastrointestinal: Negative for abdominal pain, nausea and vomiting.     Objective:     Vital Signs (Most Recent):  Temp: 97.5 °F (36.4 °C) (03/25/19 1623)  Pulse: 67 (03/25/19 1623)  Resp: 19 (03/25/19 1623)  BP: 123/76 (03/25/19 1623)  SpO2: (!) 94 % (03/25/19 1623) Vital Signs (24h Range):  Temp:  [97 °F (36.1 °C)-99.4 °F (37.4 °C)] 97.5 °F (36.4 °C)  Pulse:  [56-98] 67  Resp:  [14-19] 19  SpO2:  [93 %-98 %] 94 %  BP: (101-123)/(50-76) 123/76     Weight: 97.8 kg (215 lb 9.8 oz)  Body mass index is 32.78 kg/m².    Intake/Output Summary (Last 24 hours) at 3/25/2019 1646  Last data filed at 3/25/2019 0500  Gross per 24 hour   Intake 2200 ml   Output 1950 ml   Net 250 ml      Physical Exam   Constitutional: He appears well-developed and well-nourished. No distress.   HENT:   Head: Normocephalic and atraumatic.   Eyes: Conjunctivae and EOM are normal.   Cardiovascular: Normal rate, regular rhythm, S1 normal, S2 normal and intact distal pulses.   Pulmonary/Chest: Effort normal and breath sounds normal.   Abdominal: Soft. He exhibits no distension. There is no tenderness.   Musculoskeletal: Normal range of motion. He exhibits no edema.   Neurological:   Drowsy, but responsive to questions with some slowed speech. Attention and concentration ~10sec. Oriented x 3 (self, "hospital," "2019")   Skin: Skin is warm and dry.   Nursing note and vitals reviewed.    Significant Labs:   CBC:  Recent Labs   Lab 03/23/19  0845  03/23/19  1126 03/24/19  0349 03/25/19  0618   WBC 18.00*  --   --  10.51 6.30   HGB 13.2*  --   --  10.6* 9.3*   HCT 38.0*   < > 29* 32.2* 29.1*     --   --  176 175   GRAN 67.8  12.2*  --   --  85.0*  8.9* 56.4  3.6 "   LYMPH 26.0  4.7  --   --  12.0*  1.3 36.2  2.3   MONO 4.3  0.8  --   --  2.6*  0.3 6.3  0.4   EOS 0.2  --   --  0.0 0.0   BASO 0.10  --   --  0.01 0.02    < > = values in this interval not displayed.      CMP:  Recent Labs   Lab 03/23/19  0845 03/23/19  1518 03/23/19 2013 03/24/19  0349 03/25/19  0618    141 139 137 142   K 2.4* 4.2 4.1 3.7 2.9*   CL 99* 108 108 105 110   CO2 20* 25 22* 24 25   BUN 19 16 14 11 12   CREATININE 1.6* 1.3 1.3 1.1 1.1   * 80 87 100 79   CALCIUM 9.2 8.9 9.0 9.1 8.5*   MG  --  2.0  --  2.0 2.4   PHOS  --  4.0  --  3.1 2.8   ALKPHOS 44  --   --  42* 43*   *  --   --  225* 269*   *  --   --  167* 230*   BILITOT 0.6  --   --  0.4 0.4   PROT 7.9  --   --  7.1 6.3   ALBUMIN 4.4  --   --  4.0 3.6   ANIONGAP 19* 8 9 8 7*     Recent Labs   Lab 03/23/19  0915 03/23/19  1335 03/23/19  1832  03/25/19  0618   TSH >100.00*  --   --   --   --    FREET4 <0.25*  --   --   --   --    CORTISOL  --  23.80  --   --   --    CPK 36455*  --   --    < > 5817*   LACTATE 6.6* 3.8* 3.0*  --   --     < > = values in this interval not displayed.     Significant Imaging:   No new imaging this morning.

## 2019-03-25 NOTE — PLAN OF CARE
Problem: Adult Inpatient Plan of Care  Goal: Plan of Care Review  Outcome: Ongoing (interventions implemented as appropriate)  Patient on room air saturation 96% with clear BS, breathing exercise done with good effort.

## 2019-03-25 NOTE — PLAN OF CARE
Problem: Adult Inpatient Plan of Care  Goal: Plan of Care Review  Outcome: Ongoing (interventions implemented as appropriate)  Pt in bed. VSS. No pain noted. Pt mental status altered oriented to time but no situation or place. Pt calls down hallway and does not remember being told to press the call light. Pt began to cry.Pt seems anxious and afraid due to confusion. Reassured pt that he is safe.  Call light in reach, bed in lowest position and locked. Safety rounds completed. Will continue to monitor.

## 2019-03-25 NOTE — PLAN OF CARE
Problem: Physical Therapy Goal  Goal: Physical Therapy Goal  Goals to be met by: 19    Patient will increase functional independence with mobility by performin. Gait x 50 feet with rolling walker or least restrictive assistive device and supervision.  2. Ascend/descend curb step with rolling walker or least restrictive assistive device and supervision.   3. Sit<>stand with rolling walker or least restrictive assistive device and supervision.   4. Supine<>sit without use of hospital bed features and independence.       Patient evaluated by PT and goals established. Patient required increased time to perform all functional mobility. Supine<>sit with use of hospital bed features and Marie. Sit<>stand with Marie with RW. Gait x2 ft forwards and backwards with RW with CGA. PT will continue to follow and progress as tolerated. D/C rec TBD pending progress with therapy, may require continued therapy in facility setting. Please see progress note for detailed plan of care and recommendations.

## 2019-03-25 NOTE — PLAN OF CARE
Problem: Adult Inpatient Plan of Care  Goal: Plan of Care Review  Outcome: Ongoing (interventions implemented as appropriate)  Pt in no distress on room air, IS and aerobika done. Will continue to monitor.

## 2019-03-26 PROBLEM — E87.6 HYPOKALEMIA: Status: RESOLVED | Noted: 2019-03-23 | Resolved: 2019-03-26

## 2019-03-26 LAB
ALBUMIN SERPL BCP-MCNC: 3.6 G/DL (ref 3.5–5.2)
ALP SERPL-CCNC: 44 U/L (ref 55–135)
ALT SERPL W/O P-5'-P-CCNC: 189 U/L (ref 10–44)
ANION GAP SERPL CALC-SCNC: 6 MMOL/L (ref 8–16)
AST SERPL-CCNC: 174 U/L (ref 10–40)
BASOPHILS # BLD AUTO: 0.04 K/UL (ref 0–0.2)
BASOPHILS NFR BLD: 0.7 % (ref 0–1.9)
BILIRUB SERPL-MCNC: 0.5 MG/DL (ref 0.1–1)
BUN SERPL-MCNC: 12 MG/DL (ref 6–20)
CALCIUM SERPL-MCNC: 8.3 MG/DL (ref 8.7–10.5)
CHLORIDE SERPL-SCNC: 109 MMOL/L (ref 95–110)
CK SERPL-CCNC: 3611 U/L (ref 20–200)
CO2 SERPL-SCNC: 27 MMOL/L (ref 23–29)
CREAT SERPL-MCNC: 1.2 MG/DL (ref 0.5–1.4)
DIFFERENTIAL METHOD: ABNORMAL
EOSINOPHIL # BLD AUTO: 0.1 K/UL (ref 0–0.5)
EOSINOPHIL NFR BLD: 1.9 % (ref 0–8)
ERYTHROCYTE [DISTWIDTH] IN BLOOD BY AUTOMATED COUNT: 14.8 % (ref 11.5–14.5)
EST. GFR  (AFRICAN AMERICAN): >60 ML/MIN/1.73 M^2
EST. GFR  (NON AFRICAN AMERICAN): >60 ML/MIN/1.73 M^2
GLUCOSE SERPL-MCNC: 78 MG/DL (ref 70–110)
HCT VFR BLD AUTO: 30.1 % (ref 40–54)
HGB BLD-MCNC: 9.7 G/DL (ref 14–18)
LYMPHOCYTES # BLD AUTO: 2.4 K/UL (ref 1–4.8)
LYMPHOCYTES NFR BLD: 40.1 % (ref 18–48)
MAGNESIUM SERPL-MCNC: 2.2 MG/DL (ref 1.6–2.6)
MCH RBC QN AUTO: 29.4 PG (ref 27–31)
MCHC RBC AUTO-ENTMCNC: 32.2 G/DL (ref 32–36)
MCV RBC AUTO: 91 FL (ref 82–98)
MONOCYTES # BLD AUTO: 0.3 K/UL (ref 0.3–1)
MONOCYTES NFR BLD: 5.1 % (ref 4–15)
NEUTROPHILS # BLD AUTO: 3.1 K/UL (ref 1.8–7.7)
NEUTROPHILS NFR BLD: 51.9 % (ref 38–73)
PHOSPHATE SERPL-MCNC: 2.8 MG/DL (ref 2.7–4.5)
PLATELET # BLD AUTO: 160 K/UL (ref 150–350)
PMV BLD AUTO: 9.3 FL (ref 9.2–12.9)
POTASSIUM SERPL-SCNC: 3 MMOL/L (ref 3.5–5.1)
PROT SERPL-MCNC: 6.3 G/DL (ref 6–8.4)
RBC # BLD AUTO: 3.3 M/UL (ref 4.6–6.2)
SODIUM SERPL-SCNC: 142 MMOL/L (ref 136–145)
WBC # BLD AUTO: 5.89 K/UL (ref 3.9–12.7)

## 2019-03-26 PROCEDURE — 82550 ASSAY OF CK (CPK): CPT

## 2019-03-26 PROCEDURE — 94761 N-INVAS EAR/PLS OXIMETRY MLT: CPT

## 2019-03-26 PROCEDURE — 97110 THERAPEUTIC EXERCISES: CPT

## 2019-03-26 PROCEDURE — 99232 PR SUBSEQUENT HOSPITAL CARE,LEVL II: ICD-10-PCS | Mod: ,,, | Performed by: INTERNAL MEDICINE

## 2019-03-26 PROCEDURE — 11000001 HC ACUTE MED/SURG PRIVATE ROOM

## 2019-03-26 PROCEDURE — 25000003 PHARM REV CODE 250: Performed by: INTERNAL MEDICINE

## 2019-03-26 PROCEDURE — 84100 ASSAY OF PHOSPHORUS: CPT

## 2019-03-26 PROCEDURE — 94799 UNLISTED PULMONARY SVC/PX: CPT

## 2019-03-26 PROCEDURE — 99232 SBSQ HOSP IP/OBS MODERATE 35: CPT | Mod: ,,, | Performed by: INTERNAL MEDICINE

## 2019-03-26 PROCEDURE — A9585 GADOBUTROL INJECTION: HCPCS | Performed by: INTERNAL MEDICINE

## 2019-03-26 PROCEDURE — 83735 ASSAY OF MAGNESIUM: CPT

## 2019-03-26 PROCEDURE — 36415 COLL VENOUS BLD VENIPUNCTURE: CPT

## 2019-03-26 PROCEDURE — 25500020 PHARM REV CODE 255: Performed by: INTERNAL MEDICINE

## 2019-03-26 PROCEDURE — 97116 GAIT TRAINING THERAPY: CPT

## 2019-03-26 PROCEDURE — 85025 COMPLETE CBC W/AUTO DIFF WBC: CPT

## 2019-03-26 PROCEDURE — 92610 EVALUATE SWALLOWING FUNCTION: CPT

## 2019-03-26 PROCEDURE — 80053 COMPREHEN METABOLIC PANEL: CPT

## 2019-03-26 PROCEDURE — 99900035 HC TECH TIME PER 15 MIN (STAT)

## 2019-03-26 PROCEDURE — 94664 DEMO&/EVAL PT USE INHALER: CPT

## 2019-03-26 RX ORDER — POTASSIUM CHLORIDE 20 MEQ/1
40 TABLET, EXTENDED RELEASE ORAL ONCE
Status: COMPLETED | OUTPATIENT
Start: 2019-03-26 | End: 2019-03-26

## 2019-03-26 RX ORDER — GADOBUTROL 604.72 MG/ML
10 INJECTION INTRAVENOUS
Status: COMPLETED | OUTPATIENT
Start: 2019-03-26 | End: 2019-03-26

## 2019-03-26 RX ADMIN — GADOBUTROL 10 ML: 604.72 INJECTION INTRAVENOUS at 06:03

## 2019-03-26 RX ADMIN — LIOTHYRONINE SODIUM 5 MCG: 5 TABLET ORAL at 09:03

## 2019-03-26 RX ADMIN — SODIUM CHLORIDE: 0.9 INJECTION, SOLUTION INTRAVENOUS at 04:03

## 2019-03-26 RX ADMIN — POTASSIUM CHLORIDE 40 MEQ: 1500 TABLET, EXTENDED RELEASE ORAL at 09:03

## 2019-03-26 RX ADMIN — GADOBUTROL 10 ML: 604.72 INJECTION INTRAVENOUS at 05:03

## 2019-03-26 RX ADMIN — SODIUM CHLORIDE: 0.9 INJECTION, SOLUTION INTRAVENOUS at 09:03

## 2019-03-26 RX ADMIN — LEVOTHYROXINE SODIUM ANHYDROUS 120 MCG: 100 INJECTION, POWDER, LYOPHILIZED, FOR SOLUTION INTRAVENOUS at 09:03

## 2019-03-26 NOTE — PLAN OF CARE
Problem: SLP Goal  Goal: SLP Goal  1. Pt will be nolberto to consume a regular diet with thin liquids with no signs of dysphagia given no assistance  2. Evaluation of cognitive communication, motor speech and receptive and expressive language deficits  Outcome: Ongoing (interventions implemented as appropriate)  Pt seen for bedside swallow evaluation and initiate of motor speech, cognitive communication evaluation. Dysarthria and signs of cognitive communication, visual perceptual or receptive language deficits noted during this evaluation and further evaluation is warranted    Comments: Speech to follow 5-6xweek

## 2019-03-26 NOTE — PHYSICIAN QUERY
PT Name: Dorian Murray  MR #: [id number]     Physician Query Form - Diagnosis Clarification      CDS/: Jada Peterson RN, CCDS              Contact information: [phone]    This form is a permanent document in the medical record.     Query Date: March 26, 2019    By submitting this query, we are merely seeking further clarification of documentation.  Please utilize your independent clinical judgment when addressing the question(s) below.     The medical record contains the following:      Findings Supporting Clinical Information Location in Medical Record   Sepsis       Acute respiratory failure, suspect in the setting of myxedema coma vs. carbon monoxide poisoning vs. sepsis. CXR with some atelectasis bilateral bases, also noted on CT.    Lower suspicion for significant infectious process; likely discontinue piperacillin-tazobactam 4.5g IV q8hr.    Severe sepsis    - As under acute respiratory failure        piperacillin-tazobactam 4.5 g in dextrose 5 % 100 mL IVPB    Temp:  [96.2 °F (35.7 °C)-98.1 °F (36.7 °C)] 98.1 °F (36.7 °C)  Pulse:  [] 86  Resp:  [16-21] 18  SpO2:  [80 %-100 %] 96 %  BP: (101-130)/(45-85) 108/68    WBC 18.00  > 5.89    Lactate 6.6  > 3.8  > 3.0    Acute respiratory failure with hypoxia  KLAUDIA HM PN 3/25                            H&P 3/23        MAR 3/23      24 hour vital range 3/23              Labs 3/23-3/26        H&P 3/23       Please clarify if the _____sepsis______________________ diagnosis has been:    [  ] Ruled In   [  ] Ruled In, Now Resolved   [x  ] Ruled Out   [  ] Other/Clarification of findings (please specify):   [  ] Clinically insignificant     [  ] Clinically undetermined     Please document in your progress notes daily for the duration of treatment, until resolved, and include in your discharge summary.

## 2019-03-26 NOTE — PLAN OF CARE
Problem: Adult Inpatient Plan of Care  Goal: Plan of Care Review  Outcome: Ongoing (interventions implemented as appropriate)  Pt remains on RA. IS and aerobika done. No resp distress noted.

## 2019-03-26 NOTE — PT/OT/SLP PROGRESS
Speech Language Pathology Treatment    Patient Name:  Dorian Murray   MRN:  [id number]  Admitting Diagnosis: Myxedema coma    Recommendations:                 General Recommendations:     1. Recommend Neurology consult   2. Recommend visual perceptual assessment   3. Speech pathology 5-6x/week    Diet recommendations:  Regular, Liquid Diet Level: Thin   Aspiration Precautions: HOB to 90 degrees and Remain upright 30 minutes post meal   General Precautions: Standard, aphasia(Cognitive communication deficits, dysarthria, visual perceptual deficits)  Communication strategies:    1. Reduce distractions   2. Simple commands  3. Repetition of lengthy information  4. Tactile cues to help identify objects    Subjective     Pain/Comfort:  · Pain Rating 1: 2/10  · Location 1: other (see comments)(bladder when urinating)  · Pain Addressed 1: Nurse notified    Objective:     Has the patient been evaluated by SLP for swallowing?   Yes  Keep patient NPO? No   Current Respiratory Status:        EDUCATION LEVEL: Pt reports a HS education, follow by [employer] service. He reports no prior learning disabilities.    COGNITIVE STATUS: Alert, mild lethargic and slow responses. Oriented to person, place, date and reason for hospitalization with 100% acc. Mildly distractible. Able to sustain general attention to simple to complex cognitive tasks in a quiet environment with reduction of distractions for 36 min. However, frequent loss of concentration and forward progression of task with environmental distractions (ie loss of task when phone rang, loss of task with people were talking outside the door.) Recent memory not tested this session, focus of eval on language, reading, writing and naming tasks.     RECEPTIVE AND EXPRESSIVE LANGUAGE: Auditory Comprehension: Pt able to follow simple 1 step commands, not involving objects, with 100% acc. Pt able to answer simple to mod complex yes/no questions with 100% acc. As length and  "complexity of information increased, pt demonstrated processing delays, sequencing errors. Complex syntax and prepositions caused errors. On commands involving use of objects (ie "point to the pen, then the book") pt repeatedly stated "I cannot tell what that is" for objects on tray table in front of him. He was not able to consistently identify simple written letters, pictures objects or real objects during command following or verbal expression/naming tasks. VISUAL PERCEPTUAL: Overt signs of visual perceptual and or lyla spatial neglect noted during this exam, difficult to assess. Pt noted to turn head to the left , tilt head, open eyes wide. He had to touch object to be able to name them and then was only inconsistently accurate. Pt stating "really I am guessing by how it feels." Verbal expression: Verbal expression is fluent, and pt is able to express basic wants and needs 100% of time. Struggled to express higher level wants and needs, offer descriptions, with word finding and dcr word fluency noted. Poor performance of object and picture object naming tasks, impacted by possible visual issues. Needs ongoing assessment. Anomic blocks and hesitations noted on verbal tasks. READING COMPREHENSION: pt had difficulty reading large print letters, numbers, identifying pictures. He repeatedly stated "I cannot see it" or "I cannot tell what that is" WRITTEN EXPRESSION: Pt able to write his first name to command with 100% acc. He was able to write simple familiar vocabulary words to command with 60% acc. Errors characterized by misspelling, letter substitutions (ie bog for dog.) He was also note to write on a slant up the page, begin in the middle of the page.    Assessment:     Dorian Murray is a 29 y.o. male with an SLP diagnosis of Dysarthria and Cognitive-Communication Impairment.  He presents with signs concerning for visual disturbance during language tasks    Goals:   Multidisciplinary Problems     " SLP Goals        Problem: SLP Goal    Goal Priority Disciplines Outcome   SLP Goal     SLP Ongoing (interventions implemented as appropriate)   Description:  1. Pt will be nolberto to consume a regular diet with thin liquids with no signs of dysphagia given no assistance  2. Evaluation of cognitive communication, motor speech and receptive and expressive language deficits     3. Pt will be able to answer complex yes/no questions with 100% acc given mild repetition and verbal cues  4. Pt will be able to follow 2-3 step commands with simple familiar objects with 50% acc given max verbal and tactile cues  5. Pt will be able to name familiar pictured and actual objects with 50% acc given max visual and tactile cues                    Plan:     · Patient to be seen:  5 x/week, 6 x/week   · Plan of Care expires:  04/12/19  · Plan of Care reviewed with:  patient   · SLP Follow-Up:  Yes       Discharge recommendations:  rehabilitation facility       Time Tracking:     SLP Treatment Date:   03/26/19  Speech Start Time:  1320  Speech Stop Time:  1356     Speech Total Time (min):  36 min    Billable Minutes: Eval 36 min     Inna Yusuf CCC-SLP  03/26/2019
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
## 2019-03-26 NOTE — PLAN OF CARE
Met with patient & ex-wife at bedside to discuss discharge disposition including therapy's recommendation for IRF - both agree with recommendation & voiced [facility] as their preferred facility - referral forwarded via [facility]

## 2019-03-26 NOTE — PLAN OF CARE
Problem: Adult Inpatient Plan of Care  Goal: Plan of Care Review  Outcome: Ongoing (interventions implemented as appropriate)  Plan of care reviewed with pt. Pt. verbalized understanding. VSS on RA. Hourly rounding performed. Pt ambulated to the bathroom using walker and standby assist. No complaints of pain during shift. Pt sitting in bedside chair with wheels locked. Personal items and call bell bell within reach. Will continue to monitor.

## 2019-03-26 NOTE — PHYSICIAN QUERY
PT Name: Dorian Murray  MR #: [id number]     Physician Query Form - Diagnosis Clarification      CDS/: Jada Peterson RN, CCDS             Contact information: [phone]    This form is a permanent document in the medical record.     Query Date: March 26, 2019    By submitting this query, we are merely seeking further clarification of documentation.  Please utilize your independent clinical judgment when addressing the question(s) below.     The medical record contains the following:      Findings Supporting Clinical Information Location in Medical Record   potential adrenal insufficiency            noted to have improving mental status and was able to be extubated. He was noted to have continued altered mental status; TSH and free T4 resulted showing significantly elevated TSH and very low T4. He was subsequently started on levothyroxine and hydrocortisone for potential adrenal insufficiency with concern for myxedema coma. Mental status improved slowly but steadily    Cortisol   23.80    hydrocortisone sodium succinate injection 100 mg   X 3 doses HM PN 3/24, 3/25                            Lab 3/23    MAR 3/23-3/24     Please clarify if the _adrenal insufficiency__________________________ diagnosis has been:    [  ] Ruled In   [  ] Ruled In, Now Resolved   [ x ] Ruled Out   [  ] Other/Clarification of findings (please specify):   [  ] Clinically insignificant     [  ] Clinically undetermined     Please document in your progress notes daily for the duration of treatment, until resolved, and include in your discharge summary.
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
## 2019-03-26 NOTE — PLAN OF CARE
Problem: SLP Goal  Goal: SLP Goal  1. Pt will be nolberto to consume a regular diet with thin liquids with no signs of dysphagia given no assistance  2. Evaluation of cognitive communication, motor speech and receptive and expressive language deficits     3. Pt will be able to answer complex yes/no questions with 100% acc given mild repetition and verbal cues  4. Pt will be able to follow 2-3 step commands with simple familiar objects with 50% acc given max verbal and tactile cues  5. Pt will be able to name familiar pictured and actual objects with 50% acc given max visual and tactile cues  Outcome: Ongoing (interventions implemented as appropriate)  Pt seen for second session this date for ongoing cognitive communication evaluation    Comments: Speech to follow 5-6x/week

## 2019-03-26 NOTE — PLAN OF CARE
Problem: Occupational Therapy Goal  Goal: Occupational Therapy Goal  Goals to be met by: 4/8/19     Patient will increase functional independence with ADLs by performing:    UE Dressing with Stand-by Assistance.  LE Dressing with Stand-by Assistance.  Grooming while standing at sink with Stand-by Assistance.  Toileting from bedside commode with Stand-by Assistance for hygiene and clothing management.   Toilet transfer to bedside commode with Stand-by Assistance.    Outcome: Ongoing (interventions implemented as appropriate)  Initial OT eval/treat complete.  MOD A lift/lower from toilet with grab bar used.  MIN A for toileting for thorough back yi hygiene.  Pt. With good BUE strength and ROM.  With delayed processing, very slowed movements, and impaired GM and FM coordination.  Able to don/doff socks seated EOB, but requiring consistent/constant instruction for task sequencing.  Post acute therapy and DME needs TBD; anticipate need for post acute therapy.  Pt. Homeless and without any assist as Pt. Has mother living out of town and previously living in car PTA.  To benefit from continued acute care OT services to increase independence in self-care/functional transfers.  OT to follow.

## 2019-03-26 NOTE — PROGRESS NOTES
[facility] Hospital Medicine  Progress Note    Patient Name: Dorian Murray  MRN: [id number]  Patient Class: IP- Inpatient   Admission Date: 3/23/2019  Length of Stay: 3 days  Attending Physician: Taina Mcbride MD  Primary Care Provider: Primary Doctor No        Subjective:     Principal Problem:Myxedema coma    HPI:  Mr. Murray is a 29/M with reported PMH of Hashimoto's who presented to [facility] ED via EMS after having been found altered on the morning of 03/23. History obtained from the patient's mother as well as ED notes. Reportedly he was found down in his car ([address] [employer]) outside a gas station; had been noted on security cameras at gas station around 0345. Supposedly was last seen normal by his ex-wife the evening of 03/22 when he took his daughter to dinner. EMS arrived after being notified by gas station personnel after his car was stationary and still running. EMS arrived and bagged the patient, and he was transported to [facility] ED. He was subsequently intubated for airway protection and hypoxic respiratory failure. Reportedly, his carboxyhemoglobin on ABG was measured at 7.2.    Patient's mother reports that he has a history of Hashimoto's; she has thyroid issues as well. Supposedly he had been on thyroid medication in the remote past, but she believes he has not been taking his medication for a significant period of time.    Hospital Course:  After admission, Mr. Patiño was noted to have improving mental status and was able to be extubated. He was noted to have continued altered mental status; TSH and free T4 resulted showing significantly elevated TSH and very low T4. He was subsequently started on levothyroxine and hydrocortisone for potential adrenal insufficiency with concern for myxedema coma. Mental status improved slowly but steadily.    Interval History:  Pt seen and examined at bedside. His mental status seems to be slowly improving. He  reports eating well. Last BM yesterday. Denies pain, fever, chills.      Review of Systems   Constitutional: Negative for chills and fever.   Respiratory: Negative for cough and shortness of breath.    Cardiovascular: Negative for chest pain and palpitations.   Gastrointestinal: Negative for abdominal pain, nausea and vomiting.     Objective:     Vital Signs (Most Recent):  Temp: 98.2 °F (36.8 °C) (03/26/19 1235)  Pulse: 70 (03/26/19 1235)  Resp: 20 (03/26/19 1235)  BP: 103/60 (03/26/19 1235)  SpO2: 95 % (03/26/19 1235) Vital Signs (24h Range):  Temp:  [97.5 °F (36.4 °C)-99 °F (37.2 °C)] 98.2 °F (36.8 °C)  Pulse:  [60-97] 70  Resp:  [18-20] 20  SpO2:  [92 %-96 %] 95 %  BP: (103-123)/(60-76) 103/60     Weight: 97.8 kg (215 lb 9.8 oz)  Body mass index is 32.78 kg/m².  No intake or output data in the 24 hours ending 03/26/19 1418   Physical Exam   Constitutional: He is oriented to person, place, and time. He appears well-developed and well-nourished. No distress.   Voice deep with slowed speech   HENT:   Head: Normocephalic and atraumatic.   Cardiovascular: Normal rate, regular rhythm, S1 normal, S2 normal and intact distal pulses.   Pulmonary/Chest: Effort normal and breath sounds normal. No respiratory distress.   Abdominal: Soft. He exhibits no distension. There is no tenderness.   Musculoskeletal: Normal range of motion. He exhibits no edema.   Neurological: He is alert and oriented to person, place, and time.   Awake and responsive to questions with some slowed speech.   Skin: Skin is warm and dry.   Nursing note and vitals reviewed.    Significant Labs:   CBC:  Recent Labs   Lab 03/24/19  0349 03/25/19  0618 03/26/19  0751   WBC 10.51 6.30 5.89   HGB 10.6* 9.3* 9.7*   HCT 32.2* 29.1* 30.1*    175 160   GRAN 85.0*  8.9* 56.4  3.6 51.9  3.1   LYMPH 12.0*  1.3 36.2  2.3 40.1  2.4   MONO 2.6*  0.3 6.3  0.4 5.1  0.3   EOS 0.0 0.0 0.1   BASO 0.01 0.02 0.04      CMP:  Recent Labs   Lab 03/24/19  0347  03/25/19  0618 03/26/19  0750    142 142   K 3.7 2.9* 3.0*    110 109   CO2 24 25 27   BUN 11 12 12   CREATININE 1.1 1.1 1.2    79 78   CALCIUM 9.1 8.5* 8.3*   MG 2.0 2.4 2.2   PHOS 3.1 2.8 2.8   ALKPHOS 42* 43* 44*   * 269* 174*   * 230* 189*   BILITOT 0.4 0.4 0.5   PROT 7.1 6.3 6.3   ALBUMIN 4.0 3.6 3.6   ANIONGAP 8 7* 6*     Recent Labs   Lab 03/23/19  0915 03/23/19  1335 03/23/19  1832  03/26/19  0750   TSH >100.00*  --   --   --   --    FREET4 <0.25*  --   --   --   --    CORTISOL  --  23.80  --   --   --    CPK 33474*  --   --    < > 3611*   LACTATE 6.6* 3.8* 3.0*  --   --     < > = values in this interval not displayed.     Significant Imaging:   No new imaging this morning.    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  - TSH >100, FT4 < 0.25 on initial presentation; cortisol 23.8. Received levothyroxine 100mcg IV and 175mcg per NG 03/23.  - Discussed with endocrinology via transfer center; continue levothyroxine at 120mcg IV with liothyronine 5mcg PO BID for several days, then transition to levothyroxine 150mcg PO daily.  - Repeat TSH, FT4 tomorrow to ensure improvement. Will need outpatient endocrinology follow-up.  - Discontinued hydrocortisone given cortisol level stable.  - Continue PT/OT    Anemia  - Mild normocytic anemia with underlying thyroid disease.  - B12/folate WNL, Iron studies WNL    Non-traumatic rhabdomyolysis  - 2/2 myxedema coma  - Initial ,000s. Now down to 3600  - Continue IVF for now, decrease to 75 mL/hr since PO intake improving    Toxic effect of carbon monoxide, unintentional  - As under respiratory failure.    Hashimoto's thyroiditis  - As under  myxedema coma.  - Plan to transition to levothyroxine in a couple of days    KLAUDIA (acute kidney injury)  - Secondary to myxedema coma/rhabdomyolysis.  - Now resolved. Renal function stable      Transaminitis  - Likely secondary to rhabdomyolysis and myxedema coma  - Down trending. Continuing to monitor.    Acute respiratory failure with hypoxia  - Acute respiratory failure, suspect in the setting of myxedema coma vs. carbon monoxide poisoning vs. sepsis. CXR with some atelectasis bilateral bases, also noted on CT.  - Lower suspicion for significant infectious process so piperacillin-tazobactam stopped  - Incentive spirometry / aerobika q4hr for atelectasis.  - Weaned off of NRB evening of 03/23 for potential carbon monoxide poisoning  - Sats now stable on RA      VTE Risk Mitigation (From admission, onward)        Ordered     IP VTE LOW RISK PATIENT  Once      03/23/19 1228     Place sequential compression device  Until discontinued      03/23/19 1228          Dispo: pending clinical improvement    Taina Mcbride MD  Department of Hospital Medicine   [facility]

## 2019-03-26 NOTE — ASSESSMENT & PLAN NOTE
- 2/2 myxedema coma  - Initial ,000s. Now down to 3600  - Continue IVF for now, decrease to 75 mL/hr since PO intake improving

## 2019-03-26 NOTE — SUBJECTIVE & OBJECTIVE
Interval History:  Pt seen and examined at bedside. His mental status seems to be slowly improving. He reports eating well. Last BM yesterday. Denies pain, fever, chills.      Review of Systems   Constitutional: Negative for chills and fever.   Respiratory: Negative for cough and shortness of breath.    Cardiovascular: Negative for chest pain and palpitations.   Gastrointestinal: Negative for abdominal pain, nausea and vomiting.     Objective:     Vital Signs (Most Recent):  Temp: 98.2 °F (36.8 °C) (03/26/19 1235)  Pulse: 70 (03/26/19 1235)  Resp: 20 (03/26/19 1235)  BP: 103/60 (03/26/19 1235)  SpO2: 95 % (03/26/19 1235) Vital Signs (24h Range):  Temp:  [97.5 °F (36.4 °C)-99 °F (37.2 °C)] 98.2 °F (36.8 °C)  Pulse:  [60-97] 70  Resp:  [18-20] 20  SpO2:  [92 %-96 %] 95 %  BP: (103-123)/(60-76) 103/60     Weight: 97.8 kg (215 lb 9.8 oz)  Body mass index is 32.78 kg/m².  No intake or output data in the 24 hours ending 03/26/19 1418   Physical Exam   Constitutional: He is oriented to person, place, and time. He appears well-developed and well-nourished. No distress.   Voice deep with slowed speech   HENT:   Head: Normocephalic and atraumatic.   Cardiovascular: Normal rate, regular rhythm, S1 normal, S2 normal and intact distal pulses.   Pulmonary/Chest: Effort normal and breath sounds normal. No respiratory distress.   Abdominal: Soft. He exhibits no distension. There is no tenderness.   Musculoskeletal: Normal range of motion. He exhibits no edema.   Neurological: He is alert and oriented to person, place, and time.   Awake and responsive to questions with some slowed speech.   Skin: Skin is warm and dry.   Nursing note and vitals reviewed.    Significant Labs:   CBC:  Recent Labs   Lab 03/24/19  0349 03/25/19  0618 03/26/19  0751   WBC 10.51 6.30 5.89   HGB 10.6* 9.3* 9.7*   HCT 32.2* 29.1* 30.1*    175 160   GRAN 85.0*  8.9* 56.4  3.6 51.9  3.1   LYMPH 12.0*  1.3 36.2  2.3 40.1  2.4   MONO 2.6*  0.3 6.3   0.4 5.1  0.3   EOS 0.0 0.0 0.1   BASO 0.01 0.02 0.04      CMP:  Recent Labs   Lab 03/24/19  0349 03/25/19  0618 03/26/19  0750    142 142   K 3.7 2.9* 3.0*    110 109   CO2 24 25 27   BUN 11 12 12   CREATININE 1.1 1.1 1.2    79 78   CALCIUM 9.1 8.5* 8.3*   MG 2.0 2.4 2.2   PHOS 3.1 2.8 2.8   ALKPHOS 42* 43* 44*   * 269* 174*   * 230* 189*   BILITOT 0.4 0.4 0.5   PROT 7.1 6.3 6.3   ALBUMIN 4.0 3.6 3.6   ANIONGAP 8 7* 6*     Recent Labs   Lab 03/23/19  0915 03/23/19  1335 03/23/19  1832  03/26/19  0750   TSH >100.00*  --   --   --   --    FREET4 <0.25*  --   --   --   --    CORTISOL  --  23.80  --   --   --    CPK 68625*  --   --    < > 3611*   LACTATE 6.6* 3.8* 3.0*  --   --     < > = values in this interval not displayed.     Significant Imaging:   No new imaging this morning.

## 2019-03-26 NOTE — ASSESSMENT & PLAN NOTE
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  - TSH >100, FT4 < 0.25 on initial presentation; cortisol 23.8. Received levothyroxine 100mcg IV and 175mcg per NG 03/23.  - Discussed with endocrinology via transfer center; continue levothyroxine at 120mcg IV with liothyronine 5mcg PO BID for several days, then transition to levothyroxine 150mcg PO daily.  - Repeat TSH, FT4 tomorrow to ensure improvement. Will need outpatient endocrinology follow-up.  - Discontinued hydrocortisone given cortisol level stable.  - Continue PT/OT

## 2019-03-26 NOTE — PLAN OF CARE
Problem: Adult Inpatient Plan of Care  Goal: Plan of Care Review  Outcome: Ongoing (interventions implemented as appropriate)  POC reviewed with pt. Purposeful rounding completed. Pt in bed sleeping. No pain or acute distress this shift. Pt had large bowel movement. Condom cath replaced. Pt still disoriented but improving. Pt had episodes of bradycardia this shift. VSS. Call light within reach. Bed in lowest position and locked, instructed to call for needs. Pt states no further needs at this time.

## 2019-03-26 NOTE — PLAN OF CARE
Problem: Adult Inpatient Plan of Care  Goal: Plan of Care Review  Outcome: Ongoing (interventions implemented as appropriate)  Pt in no distress on room air, IS and Aerobika done.

## 2019-03-26 NOTE — ASSESSMENT & PLAN NOTE
- Acute respiratory failure, suspect in the setting of myxedema coma vs. carbon monoxide poisoning vs. sepsis. CXR with some atelectasis bilateral bases, also noted on CT.  - Lower suspicion for significant infectious process so piperacillin-tazobactam stopped  - Incentive spirometry / aerobika q4hr for atelectasis.  - Weaned off of NRB evening of 03/23 for potential carbon monoxide poisoning  - Sats now stable on RA

## 2019-03-26 NOTE — PT/OT/SLP PROGRESS
Occupational Therapy      Patient Name:  Dorian Murray   MRN:  [id number]    Patient attempted this PM, however off floor for MRI. Will follow-up as appropriate per OT POC.    Daja Saucedo OTR/L  3/26/2019

## 2019-03-26 NOTE — PLAN OF CARE
Problem: Physical Therapy Goal  Goal: Physical Therapy Goal  Goals to be met by: 19    Patient will increase functional independence with mobility by performin. Gait x 50 feet with rolling walker or least restrictive assistive device and supervision.  2. Ascend/descend curb step with rolling walker or least restrictive assistive device and supervision.   3. Sit<>stand with rolling walker or least restrictive assistive device and supervision.   4. Supine<>sit without use of hospital bed features and independence.        Pt progressing well towards goals, sup to sit SBA from flat bed, sit to stand CGA with RW, amb'd 120' x 2 with RW and CGA/occ min.A, seated rest b/w trials. Pending progress, pt may need cont PT in a facility.

## 2019-03-26 NOTE — PT/OT/SLP PROGRESS
Physical Therapy Treatment    Patient Name:  Dorian Murray   MRN:  [id number]    Recommendations:     Discharge Recommendations:  rehabilitation facility   Discharge Equipment Recommendations: other (see comments)   Barriers to discharge: Inaccessible home and Decreased caregiver support    Assessment:     Dorian Murray is a 29 y.o. male admitted with a medical diagnosis of Myxedema coma.  He presents with the following impairments/functional limitations:  weakness, impaired endurance, impaired self care skills, impaired functional mobilty, gait instability, impaired balance, decreased coordination, decreased lower extremity function, decreased safety awareness, edema, impaired coordination ; pt with improved mobility today, amb'd in hallway with RW and CGA/min.A. Pt emotional when talking about his 5yo daughter today, crying.    Rehab Prognosis: Good; patient would benefit from acute skilled PT services to address these deficits and reach maximum level of function.    Recent Surgery: * No surgery found *      Plan:     During this hospitalization, patient to be seen 6 x/week to address the identified rehab impairments via gait training, therapeutic activities, therapeutic exercises, neuromuscular re-education and progress toward the following goals:    · Plan of Care Expires:  04/08/19    Subjective     Chief Complaint: no c/o's  Patient/Family Comments/goals: pt wanting to walk  Pain/Comfort:  · Pain Rating 1: 0/10  · Pain Rating Post-Intervention 1: 0/10      Objective:     Communicated with nurse prior to session.  Patient found HOB elevated with peripheral IV, telemetry, Condom Catheter upon PT entry to room.     General Precautions: Standard, aphasia, fall   Orthopedic Precautions:N/A   Braces: N/A     Functional Mobility:  · Bed Mobility:     · Supine to Sit: stand by assistance  · Transfers:     · Sit to Stand:  contact guard assistance with rolling walker  · Gait: amb'd ~120' x  2 with RW and CGA/min.A, chair following. pt seated rest b/w.      AM-PAC 6 CLICK MOBILITY  Turning over in bed (including adjusting bedclothes, sheets and blankets)?: 3  Sitting down on and standing up from a chair with arms (e.g., wheelchair, bedside commode, etc.): 3  Moving from lying on back to sitting on the side of the bed?: 3  Moving to and from a bed to a chair (including a wheelchair)?: 3  Need to walk in hospital room?: 3  Climbing 3-5 steps with a railing?: 2  Basic Mobility Total Score: 17       Therapeutic Activities and Exercises:   pt perf'd seated LE ex's of heel/toe raises, hip flex, LAQ's x 10 ea.     Patient left up in chair with all lines intact, call button in reach, nurse notified and friend present..    GOALS:   Multidisciplinary Problems     Physical Therapy Goals        Problem: Physical Therapy Goal    Goal Priority Disciplines Outcome Goal Variances Interventions   Physical Therapy Goal     PT, PT/OT      Description:  Goals to be met by: 19    Patient will increase functional independence with mobility by performin. Gait x 50 feet with rolling walker or least restrictive assistive device and supervision.  2. Ascend/descend curb step with rolling walker or least restrictive assistive device and supervision.   3. Sit<>stand with rolling walker or least restrictive assistive device and supervision.   4. Supine<>sit without use of hospital bed features and independence.                         Time Tracking:     PT Received On: 19  PT Start Time: 58     PT Stop Time: 1033  PT Total Time (min): 35 min     Billable Minutes: Gait Training 20 and Therapeutic Exercise 15    Treatment Type: Treatment  PT/PTA: PTA     PTA Visit Number: 1     Hilda Puente PTA  2019

## 2019-03-27 PROBLEM — N17.9 AKI (ACUTE KIDNEY INJURY): Status: RESOLVED | Noted: 2019-03-23 | Resolved: 2019-03-27

## 2019-03-27 PROBLEM — J96.01 ACUTE RESPIRATORY FAILURE WITH HYPOXIA: Status: RESOLVED | Noted: 2019-03-23 | Resolved: 2019-03-27

## 2019-03-27 PROBLEM — H53.9 VISUAL DISTURBANCE: Status: ACTIVE | Noted: 2019-03-27

## 2019-03-27 PROBLEM — T58.91XA TOXIC EFFECT OF CARBON MONOXIDE, UNINTENTIONAL: Status: RESOLVED | Noted: 2019-03-23 | Resolved: 2019-03-27

## 2019-03-27 LAB
ALBUMIN SERPL BCP-MCNC: 3.9 G/DL (ref 3.5–5.2)
ALP SERPL-CCNC: 46 U/L (ref 55–135)
ALT SERPL W/O P-5'-P-CCNC: 171 U/L (ref 10–44)
ANION GAP SERPL CALC-SCNC: 6 MMOL/L (ref 8–16)
AST SERPL-CCNC: 143 U/L (ref 10–40)
BASOPHILS # BLD AUTO: 0.03 K/UL (ref 0–0.2)
BASOPHILS NFR BLD: 0.6 % (ref 0–1.9)
BILIRUB SERPL-MCNC: 0.6 MG/DL (ref 0.1–1)
BUN SERPL-MCNC: 13 MG/DL (ref 6–20)
CALCIUM SERPL-MCNC: 8.9 MG/DL (ref 8.7–10.5)
CHLORIDE SERPL-SCNC: 106 MMOL/L (ref 95–110)
CO2 SERPL-SCNC: 27 MMOL/L (ref 23–29)
CREAT SERPL-MCNC: 1.2 MG/DL (ref 0.5–1.4)
DIFFERENTIAL METHOD: ABNORMAL
EOSINOPHIL # BLD AUTO: 0.2 K/UL (ref 0–0.5)
EOSINOPHIL NFR BLD: 3.4 % (ref 0–8)
ERYTHROCYTE [DISTWIDTH] IN BLOOD BY AUTOMATED COUNT: 14.5 % (ref 11.5–14.5)
EST. GFR  (AFRICAN AMERICAN): >60 ML/MIN/1.73 M^2
EST. GFR  (NON AFRICAN AMERICAN): >60 ML/MIN/1.73 M^2
GLUCOSE SERPL-MCNC: 84 MG/DL (ref 70–110)
HCT VFR BLD AUTO: 33 % (ref 40–54)
HGB BLD-MCNC: 10.8 G/DL (ref 14–18)
LYMPHOCYTES # BLD AUTO: 1.8 K/UL (ref 1–4.8)
LYMPHOCYTES NFR BLD: 39.1 % (ref 18–48)
MAGNESIUM SERPL-MCNC: 2.4 MG/DL (ref 1.6–2.6)
MCH RBC QN AUTO: 29.8 PG (ref 27–31)
MCHC RBC AUTO-ENTMCNC: 32.7 G/DL (ref 32–36)
MCV RBC AUTO: 91 FL (ref 82–98)
MONOCYTES # BLD AUTO: 0.2 K/UL (ref 0.3–1)
MONOCYTES NFR BLD: 3.6 % (ref 4–15)
NEUTROPHILS # BLD AUTO: 2.5 K/UL (ref 1.8–7.7)
NEUTROPHILS NFR BLD: 53.1 % (ref 38–73)
PHOSPHATE SERPL-MCNC: 3.5 MG/DL (ref 2.7–4.5)
PLATELET # BLD AUTO: 173 K/UL (ref 150–350)
PMV BLD AUTO: 9.5 FL (ref 9.2–12.9)
POTASSIUM SERPL-SCNC: 3.8 MMOL/L (ref 3.5–5.1)
PROT SERPL-MCNC: 6.9 G/DL (ref 6–8.4)
RBC # BLD AUTO: 3.62 M/UL (ref 4.6–6.2)
SODIUM SERPL-SCNC: 139 MMOL/L (ref 136–145)
T4 FREE SERPL-MCNC: 0.47 NG/DL (ref 0.71–1.51)
TSH SERPL DL<=0.005 MIU/L-ACNC: >375 UIU/ML (ref 0.4–4)
WBC # BLD AUTO: 4.68 K/UL (ref 3.9–12.7)

## 2019-03-27 PROCEDURE — 11000001 HC ACUTE MED/SURG PRIVATE ROOM

## 2019-03-27 PROCEDURE — 99900035 HC TECH TIME PER 15 MIN (STAT)

## 2019-03-27 PROCEDURE — 36415 COLL VENOUS BLD VENIPUNCTURE: CPT

## 2019-03-27 PROCEDURE — 97535 SELF CARE MNGMENT TRAINING: CPT

## 2019-03-27 PROCEDURE — 25000003 PHARM REV CODE 250: Performed by: INTERNAL MEDICINE

## 2019-03-27 PROCEDURE — 85025 COMPLETE CBC W/AUTO DIFF WBC: CPT

## 2019-03-27 PROCEDURE — 84100 ASSAY OF PHOSPHORUS: CPT

## 2019-03-27 PROCEDURE — 99232 PR SUBSEQUENT HOSPITAL CARE,LEVL II: ICD-10-PCS | Mod: ,,, | Performed by: INTERNAL MEDICINE

## 2019-03-27 PROCEDURE — 84443 ASSAY THYROID STIM HORMONE: CPT

## 2019-03-27 PROCEDURE — 94664 DEMO&/EVAL PT USE INHALER: CPT

## 2019-03-27 PROCEDURE — 97116 GAIT TRAINING THERAPY: CPT

## 2019-03-27 PROCEDURE — 80053 COMPREHEN METABOLIC PANEL: CPT

## 2019-03-27 PROCEDURE — 99232 SBSQ HOSP IP/OBS MODERATE 35: CPT | Mod: ,,, | Performed by: INTERNAL MEDICINE

## 2019-03-27 PROCEDURE — 83735 ASSAY OF MAGNESIUM: CPT

## 2019-03-27 PROCEDURE — 84439 ASSAY OF FREE THYROXINE: CPT

## 2019-03-27 PROCEDURE — 97110 THERAPEUTIC EXERCISES: CPT

## 2019-03-27 RX ORDER — LEVOTHYROXINE SODIUM 150 UG/1
150 TABLET ORAL
Qty: 30 TABLET | Refills: 11 | Status: SHIPPED | OUTPATIENT
Start: 2019-03-28 | End: 2020-05-13 | Stop reason: SDUPTHER

## 2019-03-27 RX ORDER — LIOTHYRONINE SODIUM 5 UG/1
5 TABLET ORAL 2 TIMES DAILY
Qty: 60 TABLET | Refills: 11
Start: 2019-03-27 | End: 2020-05-13

## 2019-03-27 RX ORDER — LIOTHYRONINE SODIUM 5 UG/1
5 TABLET ORAL 2 TIMES DAILY
Status: DISCONTINUED | OUTPATIENT
Start: 2019-03-27 | End: 2019-03-29 | Stop reason: HOSPADM

## 2019-03-27 RX ADMIN — LEVOTHYROXINE SODIUM 150 MCG: 75 TABLET ORAL at 05:03

## 2019-03-27 RX ADMIN — LIOTHYRONINE SODIUM 5 MCG: 5 TABLET ORAL at 09:03

## 2019-03-27 NOTE — PLAN OF CARE
Problem: Adult Inpatient Plan of Care  Goal: Plan of Care Review  Outcome: Ongoing (interventions implemented as appropriate)  Self-dministers breathing exercisers with good effect. Minimally productive cough.

## 2019-03-27 NOTE — PLAN OF CARE
Patient has been accepted by [facility] - they have submitted for insurance auth - will plan to transfer once auth obtained & patient is medically stable

## 2019-03-27 NOTE — PLAN OF CARE
[facility]    FACILITY TRANSFER ORDERS      Patient Name: Dorian Murray  YOB: 1990    PCP: Primary Doctor No   PCP Address: None  PCP Phone Number: None  PCP Fax: None    Encounter Date: 03/28/19    Admit to: IPR    Vital Signs:  Routine    Diagnoses:   Active Hospital Problems    Diagnosis  POA    *Myxedema coma [E03.5]  Yes    Visual disturbance [H53.9]  Yes    Anemia [D64.9]  Yes    Transaminitis [R74.0]  Yes    Hashimoto's thyroiditis [E06.3]  Yes     Chronic    Non-traumatic rhabdomyolysis [M62.82]  Yes      Resolved Hospital Problems    Diagnosis Date Resolved POA    Acute respiratory failure with hypoxia [J96.01] 03/27/2019 Yes    KLAUDIA (acute kidney injury) [N17.9] 03/27/2019 Yes    Hypokalemia [E87.6] 03/26/2019 Yes    Toxic effect of carbon monoxide, unintentional [T58.91XA] 03/27/2019 Yes       Allergies:Review of patient's allergies indicates:  No Known Allergies    Diet: regular diet    Activities: Activity as tolerated    Labs: Free T4 weekly. Please check on 4/3/19. TSH monthly. Please check 4/27/19    CONSULTS:    Physical Therapy to evaluate and treat. , Occupational Therapy to evaluate and treat. and Speech Therapy to evaluate and treat for Language and Cognition.    MISCELLANEOUS CARE:  n/a    WOUND CARE ORDERS  None    Medications: Review discharge medications with patient and family and provide education.      Current Discharge Medication List      START taking these medications    Details   levothyroxine (SYNTHROID) 150 MCG tablet Take 1 tablet (150 mcg total) by mouth before breakfast.  Qty: 30 tablet, Refills: 11      liothyronine (CYTOMEL) 5 MCG Tab Take 1 tablet (5 mcg total) by mouth 2 (two) times daily.  Qty: 60 tablet, Refills: 11                  _________________________________  Taina Mcbride MD  03/27/2019

## 2019-03-27 NOTE — PROGRESS NOTES
[facility] Hospital Medicine  Progress Note    Patient Name: Dorian Murray  MRN: [id number]  Patient Class: IP- Inpatient   Admission Date: 3/23/2019  Length of Stay: 4 days  Attending Physician: Taina Mcbride MD  Primary Care Provider: Primary Doctor No        Subjective:     Principal Problem:Myxedema coma    HPI:  Mr. Murray is a 29/M with reported PMH of Hashimoto's who presented to [facility] ED via EMS after having been found altered on the morning of 03/23. History obtained from the patient's mother as well as ED notes. Reportedly he was found down in his car (2000 [employer]) outside a gas station; had been noted on security cameras at gas station around 0345. Supposedly was last seen normal by his ex-wife the evening of 03/22 when he took his daughter to dinner. EMS arrived after being notified by gas station personnel after his car was stationary and still running. EMS arrived and bagged the patient, and he was transported to [facility] ED. He was subsequently intubated for airway protection and hypoxic respiratory failure. Reportedly, his carboxyhemoglobin on ABG was measured at 7.2.    Patient's mother reports that he has a history of Hashimoto's; she has thyroid issues as well. Supposedly he had been on thyroid medication in the remote past, but she believes he has not been taking his medication for a significant period of time.    Hospital Course:  After admission, Mr. Patiño was noted to have improving mental status and was able to be extubated. He was noted to have continued altered mental status; TSH and free T4 resulted showing significantly elevated TSH and very low T4. He was subsequently started on levothyroxine, cytomel and hydrocortisone for potential adrenal insufficiency with concern for myxedema coma. Hydrocortisone was stopped after cortisol came back normal. Mental status improved slowly but steadily. He was evaluated by PT/OT/ST. PT/OT  recommended IPR so SW was consulted. ST noted visual disturbances so further imaging was done with MRI, which showed pituitary hyperplasia. Discussed with Endocrinology at [facility], Dr. Paul and this is likely due to myxedema coma and severely uncontrolled hypothyroidism.     Interval History:  Pt seen and examined at bedside. He still complains of blurry vision. Discussed results of MRI and of my discussion with endocrinology. He continues to gain strength.      Review of Systems   Constitutional: Negative for chills and fever.   Eyes: Positive for visual disturbance.   Respiratory: Negative for cough and shortness of breath.    Cardiovascular: Negative for chest pain and palpitations.   Gastrointestinal: Negative for abdominal pain, nausea and vomiting.     Objective:     Vital Signs (Most Recent):  Temp: 98 °F (36.7 °C) (03/27/19 1219)  Pulse: (!) 56 (03/27/19 1219)  Resp: 18 (03/27/19 0735)  BP: 102/70 (03/27/19 1219)  SpO2: 99 % (03/27/19 1219) Vital Signs (24h Range):  Temp:  [97.4 °F (36.3 °C)-98.2 °F (36.8 °C)] 98 °F (36.7 °C)  Pulse:  [48-71] 56  Resp:  [16-18] 18  SpO2:  [97 %-100 %] 99 %  BP: (102-119)/(59-78) 102/70     Weight: 97.8 kg (215 lb 9.8 oz)  Body mass index is 32.78 kg/m².    Intake/Output Summary (Last 24 hours) at 3/27/2019 1418  Last data filed at 3/27/2019 0600  Gross per 24 hour   Intake 2130.75 ml   Output 3825 ml   Net -1694.25 ml      Physical Exam   Constitutional: He is oriented to person, place, and time. He appears well-developed and well-nourished. No distress.   Voice deep with slowed speech   HENT:   Head: Normocephalic and atraumatic.   Cardiovascular: Normal rate, regular rhythm, S1 normal, S2 normal and intact distal pulses.   Pulmonary/Chest: Effort normal and breath sounds normal. No respiratory distress.   Abdominal: Soft. He exhibits no distension. There is no tenderness.   Musculoskeletal: Normal range of motion. He exhibits no edema.   Neurological: He is alert and oriented  to person, place, and time.   Awake and responsive to questions with slowed speech that is slowly improving   Skin: Skin is warm and dry.   Nursing note and vitals reviewed.    Significant Labs:   CBC:  Recent Labs   Lab 03/25/19  0618 03/26/19  0751 03/27/19  0546   WBC 6.30 5.89 4.68   HGB 9.3* 9.7* 10.8*   HCT 29.1* 30.1* 33.0*    160 173   GRAN 56.4  3.6 51.9  3.1 53.1  2.5   LYMPH 36.2  2.3 40.1  2.4 39.1  1.8   MONO 6.3  0.4 5.1  0.3 3.6*  0.2*   EOS 0.0 0.1 0.2   BASO 0.02 0.04 0.03      CMP:  Recent Labs   Lab 03/25/19  0618 03/26/19  0750 03/27/19  0546    142 139   K 2.9* 3.0* 3.8    109 106   CO2 25 27 27   BUN 12 12 13   CREATININE 1.1 1.2 1.2   GLU 79 78 84   CALCIUM 8.5* 8.3* 8.9   MG 2.4 2.2 2.4   PHOS 2.8 2.8 3.5   ALKPHOS 43* 44* 46*   * 174* 143*   * 189* 171*   BILITOT 0.4 0.5 0.6   PROT 6.3 6.3 6.9   ALBUMIN 3.6 3.6 3.9   ANIONGAP 7* 6* 6*     Recent Labs   Lab 03/23/19  1335 03/23/19  1832  03/26/19  0750 03/27/19  0546   TSH  --   --   --   --  >375.000*   FREET4  --   --   --   --  0.47*   CORTISOL 23.80  --   --   --   --    CPK  --   --    < > 3611*  --    LACTATE 3.8* 3.0*  --   --   --     < > = values in this interval not displayed.     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  No identifiable focal lesion within the pituitary gland    Assessment/Plan:      * Myxedema coma  - Suspect primarily myxedema coma; myxedema score 45-60 (dependent on whether stool burden on imaging is counted as decreased intestinal motility; normal temperature, HR, no specific precipitating event, but did have obtundation on initial presentation, QT prolongation, hypoxemia and elevated creatinine).  - TSH >100, FT4 < 0.25 on initial presentation; cortisol 23.8. Received IV levothyroxine and cytomel BID  - Discussed with endocrinology, Dr. Paul via transfer center.   - FT4 improving. TSH still markedly  elevated but is likely down trending (lab cutoff different at OSH so TSH appears higher but is not)  - Discontinued hydrocortisone given cortisol level stable.  - Per Dr. Paul, will continue levothyroxine 150 mcg qd and cytomel 5 mcg BID until f/u with outpatient endocrinology.  - Will check weekly FT4 and monthly TSH  - PT/OT recommending IPR. SW consulted    Visual disturbance  2/2 pituitary hyperplasia seen on MRI.   D/w endocrinology, this is likely due to severe hypothyroidism and is expected to improve with time and treatment  Discussed this with patient.      Anemia  - Mild normocytic anemia with underlying thyroid disease.  - B12/folate WNL, Iron studies WNL    Non-traumatic rhabdomyolysis  - 2/2 myxedema coma  - Initial ,000s. Now down to 3600  - IVF stopped with adequate PO intake    Hashimoto's thyroiditis  Continue levothyroxine 150 mcg qd and cytomel 5 mcg BID    Transaminitis  - Secondary to rhabdomyolysis and myxedema coma  - Down trending. Continuing to monitor.      VTE Risk Mitigation (From admission, onward)        Ordered     IP VTE LOW RISK PATIENT  Once      03/23/19 1228     Place sequential compression device  Until discontinued      03/23/19 1228          Dispo: IPR    Taina Mcbride MD  Department of Hospital Medicine   [facility]

## 2019-03-27 NOTE — ASSESSMENT & PLAN NOTE
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  - TSH >100, FT4 < 0.25 on initial presentation; cortisol 23.8. Received IV levothyroxine and cytomel BID  - Discussed with endocrinology, Dr. Paul via transfer center.   - FT4 improving. TSH still markedly elevated but is likely down trending (lab cutoff different at OSH so TSH appears higher but is not)  - Discontinued hydrocortisone given cortisol level stable.  - Per Dr. Paul, will continue levothyroxine 150 mcg qd and cytomel 5 mcg BID until f/u with outpatient endocrinology.  - Will check weekly FT4 and monthly TSH  - PT/OT recommending IPR. SW consulted

## 2019-03-27 NOTE — SUBJECTIVE & OBJECTIVE
Interval History:  Pt seen and examined at bedside. He still complains of blurry vision. Discussed results of MRI and of my discussion with endocrinology. He continues to gain strength.      Review of Systems   Constitutional: Negative for chills and fever.   Eyes: Positive for visual disturbance.   Respiratory: Negative for cough and shortness of breath.    Cardiovascular: Negative for chest pain and palpitations.   Gastrointestinal: Negative for abdominal pain, nausea and vomiting.     Objective:     Vital Signs (Most Recent):  Temp: 98 °F (36.7 °C) (03/27/19 1219)  Pulse: (!) 56 (03/27/19 1219)  Resp: 18 (03/27/19 0735)  BP: 102/70 (03/27/19 1219)  SpO2: 99 % (03/27/19 1219) Vital Signs (24h Range):  Temp:  [97.4 °F (36.3 °C)-98.2 °F (36.8 °C)] 98 °F (36.7 °C)  Pulse:  [48-71] 56  Resp:  [16-18] 18  SpO2:  [97 %-100 %] 99 %  BP: (102-119)/(59-78) 102/70     Weight: 97.8 kg (215 lb 9.8 oz)  Body mass index is 32.78 kg/m².    Intake/Output Summary (Last 24 hours) at 3/27/2019 1418  Last data filed at 3/27/2019 0600  Gross per 24 hour   Intake 2130.75 ml   Output 3825 ml   Net -1694.25 ml      Physical Exam   Constitutional: He is oriented to person, place, and time. He appears well-developed and well-nourished. No distress.   Voice deep with slowed speech   HENT:   Head: Normocephalic and atraumatic.   Cardiovascular: Normal rate, regular rhythm, S1 normal, S2 normal and intact distal pulses.   Pulmonary/Chest: Effort normal and breath sounds normal. No respiratory distress.   Abdominal: Soft. He exhibits no distension. There is no tenderness.   Musculoskeletal: Normal range of motion. He exhibits no edema.   Neurological: He is alert and oriented to person, place, and time.   Awake and responsive to questions with slowed speech that is slowly improving   Skin: Skin is warm and dry.   Nursing note and vitals reviewed.    Significant Labs:   CBC:  Recent Labs   Lab 03/25/19  0618 03/26/19  0751 03/27/19  0546   WBC  6.30 5.89 4.68   HGB 9.3* 9.7* 10.8*   HCT 29.1* 30.1* 33.0*    160 173   GRAN 56.4  3.6 51.9  3.1 53.1  2.5   LYMPH 36.2  2.3 40.1  2.4 39.1  1.8   MONO 6.3  0.4 5.1  0.3 3.6*  0.2*   EOS 0.0 0.1 0.2   BASO 0.02 0.04 0.03      CMP:  Recent Labs   Lab 03/25/19  0618 03/26/19  0750 03/27/19  0546    142 139   K 2.9* 3.0* 3.8    109 106   CO2 25 27 27   BUN 12 12 13   CREATININE 1.1 1.2 1.2   GLU 79 78 84   CALCIUM 8.5* 8.3* 8.9   MG 2.4 2.2 2.4   PHOS 2.8 2.8 3.5   ALKPHOS 43* 44* 46*   * 174* 143*   * 189* 171*   BILITOT 0.4 0.5 0.6   PROT 6.3 6.3 6.9   ALBUMIN 3.6 3.6 3.9   ANIONGAP 7* 6* 6*     Recent Labs   Lab 03/23/19  1335 03/23/19  1832  03/26/19  0750 03/27/19  0546   TSH  --   --   --   --  >375.000*   FREET4  --   --   --   --  0.47*   CORTISOL 23.80  --   --   --   --    CPK  --   --    < > 3611*  --    LACTATE 3.8* 3.0*  --   --   --     < > = values in this interval not displayed.     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  No identifiable focal lesion within the pituitary gland

## 2019-03-27 NOTE — PT/OT/SLP PROGRESS
Physical Therapy Treatment    Patient Name:  Dorian Murray   MRN:  [id number]    Recommendations:     Discharge Recommendations:  rehabilitation facility   Discharge Equipment Recommendations: (TBD, pending progress)   Barriers to discharge: Inaccessible home and Decreased caregiver support    Assessment:     Dorian Murray is a 29 y.o. male admitted with a medical diagnosis of Myxedema coma.  He presents with the following impairments/functional limitations:  weakness, impaired self care skills, impaired functional mobilty, gait instability, impaired balance, decreased lower extremity function, decreased safety awareness, decreased coordination, edema, impaired coordination ;pt with good mobility today, however, pt req's CGA for amb. With RW and min.A for amb. Without an AD. Recommend cont PT in inpt REHAB 2/2 pt's high level of functioning (working as a ) prior to this admission. Pt is highly motivated in sessions and would be an excellent Rehab candidate.    Rehab Prognosis: Good; patient would benefit from acute skilled PT services to address these deficits and reach maximum level of function.    Recent Surgery: * No surgery found *      Plan:     During this hospitalization, patient to be seen 6 x/week to address the identified rehab impairments via gait training, therapeutic activities, therapeutic exercises, neuromuscular re-education and progress toward the following goals:    · Plan of Care Expires:  04/08/19    Subjective     Chief Complaint: none stated  Patient/Family Comments/goals: pt agreeable to session. Explained to pt that he is to call for assistance with getting up from chair or bed.     Pain/Comfort:  · Pain Rating 1: 0/10  · Pain Rating Post-Intervention 1: 0/10      Objective:     Communicated with nurse prior to session.  Patient found up in hallway ambulating with RW (very slowly) with peripheral IV, telemetry upon PT entry to room.     General  Precautions: Standard, aphasia, fall, vision impaired   Orthopedic Precautions:N/A   Braces: N/A     Functional Mobility:  · Transfers:     · Sit to Stand:  contact guard assistance with no AD  · Gait: amb'd 150' with RW and CGA, then another 150' without AD with min.A for balance (pt with LOB x 5).   · Balance: pt perf'd balance act's with min.A for tandem gt,  side stepping, retro amb., SLS for ~:5 sec on each leg wth min.A      AM-PAC 6 CLICK MOBILITY  Turning over in bed (including adjusting bedclothes, sheets and blankets)?: 3  Sitting down on and standing up from a chair with arms (e.g., wheelchair, bedside commode, etc.): 3  Moving from lying on back to sitting on the side of the bed?: 3  Moving to and from a bed to a chair (including a wheelchair)?: 3  Need to walk in hospital room?: 3  Climbing 3-5 steps with a railing?: 2  Basic Mobility Total Score: 17       Therapeutic Activities and Exercises:   perf'd seated LE ex's of heel/toe raises, hip flex, LAQ's x 10 ea.   perf'd balance act's as noted above.    Patient left up in chair with all lines intact, call button in reach and nurse notified..    GOALS:   Multidisciplinary Problems     Physical Therapy Goals        Problem: Physical Therapy Goal    Goal Priority Disciplines Outcome Goal Variances Interventions   Physical Therapy Goal     PT, PT/OT      Description:  Goals to be met by: 19    Patient will increase functional independence with mobility by performin. Gait x 50 feet with rolling walker or least restrictive assistive device and supervision.  2. Ascend/descend curb step with rolling walker or least restrictive assistive device and supervision.   3. Sit<>stand with rolling walker or least restrictive assistive device and supervision.   4. Supine<>sit without use of hospital bed features and independence.                         Time Tracking:     PT Received On: 19  PT Start Time: 1115     PT Stop Time: 1140  PT Total Time (min):  25 min     Billable Minutes: Gait Training 15 and Therapeutic Exercise 10    Treatment Type: Treatment  PT/PTA: PTA     PTA Visit Number: 2     Hilda Puente PTA  03/27/2019

## 2019-03-27 NOTE — PLAN OF CARE
Problem: Physical Therapy Goal  Goal: Physical Therapy Goal  Goals to be met by: 19    Patient will increase functional independence with mobility by performin. Gait x 50 feet with rolling walker or least restrictive assistive device and supervision.  2. Ascend/descend curb step with rolling walker or least restrictive assistive device and supervision.   3. Sit<>stand with rolling walker or least restrictive assistive device and supervision.   4. Supine<>sit without use of hospital bed features and independence.        Face-to-face discussion with PTA regarding patient's progress with therapy and discharge recommendations, PT rec for d/c to IRF. Patient was independent at home prior to this event for locomotion, ADLs, and IADLs including driving and working as a  and there is expectation of returning to prior level of function to maintain independence avoiding readmission.     Pt's clinical condition meets full inpatient rehab criteria. Inpatient rehab will provide to total interdisciplinary treatment approach needed. Pt is at high risk of unplanned readmission due to fall risk, inability to self-administer medication, and lack of 24 hour caregiver in prior setting.      Pt is able to tolerate 3 hours of daily therapy. Pt is pleasant and motivated to return to prior level of function.

## 2019-03-27 NOTE — PLAN OF CARE
Problem: Adult Inpatient Plan of Care  Goal: Plan of Care Review  Outcome: Ongoing (interventions implemented as appropriate)  Plan of care reviewed with pt. Pt verbalized understanding. Hourly rounding performed. VSS on RA. No complaints of pain during this shift. Personal items and call bell within reach. Bed lowered and locked. Will countinue to monitor.

## 2019-03-27 NOTE — PLAN OF CARE
Problem: Occupational Therapy Goal  Goal: Occupational Therapy Goal  Goals to be met by: 4/8/19     Patient will increase functional independence with ADLs by performing:    UE Dressing with Stand-by Assistance. -MET 3/27/19     REVISED GOAL: UB dressing with SPV including clothing retrieval   LE Dressing with Stand-by Assistance. -MET 3/27/19     REVISED GOAL: LB dressing with SPV  Grooming while standing at sink with Stand-by Assistance.  Toileting from bedside commode with Stand-by Assistance for hygiene and clothing management.   Toilet transfer to bedside commode with Stand-by Assistance.     Outcome: Ongoing (interventions implemented as appropriate)  Pt is making steady progress towards his OT goals. Pt has met x2 goals this date. Remaining goals still appropriate.     Daja Saucedo, OTR/L  3/27/2019

## 2019-03-27 NOTE — PLAN OF CARE
Patient resting in NAD. VSS on RA.Telemetry monitor on with alarms audible. Pt voiding per urinal at bedside. No needs expressed at this time. Pt instructed to use call light for assistance. Will continue to monitor.

## 2019-03-27 NOTE — ASSESSMENT & PLAN NOTE
2/2 pituitary hyperplasia seen on MRI.   D/w endocrinology, this is likely due to severe hypothyroidism and is expected to improve with time and treatment  Discussed this with patient.

## 2019-03-27 NOTE — PT/OT/SLP PROGRESS
Occupational Therapy   Treatment    Name: Dorian Murray  MRN: [id number]  Admitting Diagnosis:  Myxedema coma       Recommendations:     Discharge Recommendations: rehabilitation facility  Discharge Equipment Recommendations:  (TBD pending progress )  Barriers to discharge:  Decreased caregiver support(homeless, current level of function )    Assessment:     Dorian Murray is a 29 y.o. male with a medical diagnosis of Myxedema coma.  He presents with the following performance deficits affecting function are weakness, impaired endurance, impaired self care skills, impaired functional mobilty, gait instability, impaired balance, visual deficits, impaired cognition, decreased safety awareness.     Pt is making steady progress towards his OT goals. Notable improvements in his self-care, specifically showering and total body dressing this date. He is continuing to require occasional cues for sequencing, initiation and safety throughout functional tasks. Continues to require SBA for functional mobility and transfers with use of RW. Ongoing visual, cognitive and language deficits- delayed responses, occasional word finding difficulty and impaired reading most notable throughout today's tasks. Feel that pt would benefit from a comprehensive, interdisciplinary approach within rehab setting. Pt was very high level PTA and there is expectation for full return. At this time, pt would be unable to manage his own finances or medications, return to full time work or safely care-take for his daughter. Pt could tolerate the three hours expected.     Rehab Prognosis:  Good; patient would benefit from acute skilled OT services to address these deficits and reach maximum level of function.       Plan:     Patient to be seen 6 x/week to address the above listed problems via self-care/home management, therapeutic activities, therapeutic exercises  · Plan of Care Expires: 04/08/19  · Plan of Care Reviewed  with: patient    Subjective     Pain/Comfort:  · Pain Rating 1: 0/10  · Pain Rating Post-Intervention 1: 0/10    Objective:     Communicated with: RN prior to session.  Patient found supine with peripheral IV, telemetry upon OT entry to room.    General Precautions: Standard, aphasia, vision impaired, fall   Orthopedic Precautions:N/A   Braces: N/A     Occupational Performance:     Bed Mobility:    · Patient completed Scooting/Bridging with modified independence  · Patient completed Supine to Sit with modified independence     Functional Mobility/Transfers:  · Patient completed Sit <> Stand Transfer with stand by assistance  with  rolling walker   · Patient completed Bed <> Chair Transfer using Step Transfer technique with stand by assistance with rolling walker  · Patient completed  Shower Transfer Step Transfer technique with stand by assistance with rolling walker  · Functional Mobility: house-hold level using RW with SBA    Cues for safety and RW technique throughout all transfers. Encouraged pt to call for staff when completing any OOB mobility- when arrived back in room from gathering bathing supplies, pt midway through transfer from EOB to bedside chair (pushing tray table outside his EDMUNDO and RW not present)    Activities of Daily Living:  · Bathing: stand by assistance to wash and dry 10/10 body parts; SBA for standing balance; cues for sequencing and initiation of task  · Upper Body Dressing: stand by assistance to don clean gown s/p shower for standing balance   · Lower Body Dressing: stand by assistance to don sonali socks and boxers- cues for safer technique (pt wanted to complete from standing position); SBA for standing balance       [facility] 6 Click ADL: 19    Treatment & Education:  OT role and POC, safety with ADLs and functional mobility, need to call for (A) when attempting OOB mobility    Pt unable to read text message received during session- instead asked therapist to dictate. Pt able to state  individual letters, however unable to read word. When verbally provided sequence of letters- able to state word.     Patient left up in chair with all lines intact, call button in reach and RN notifiedEducation:      GOALS:   Multidisciplinary Problems     Occupational Therapy Goals        Problem: Occupational Therapy Goal    Goal Priority Disciplines Outcome Interventions   Occupational Therapy Goal     OT, PT/OT Ongoing (interventions implemented as appropriate)    Description:  Goals to be met by: 4/8/19     Patient will increase functional independence with ADLs by performing:    UE Dressing with Stand-by Assistance. -MET 3/27/19     REVISED GOAL: UB dressing with SPV including clothing retrieval   LE Dressing with Stand-by Assistance. -MET 3/27/19     REVISED GOAL: LB dressing with SPV  Grooming while standing at sink with Stand-by Assistance.  Toileting from bedside commode with Stand-by Assistance for hygiene and clothing management.   Toilet transfer to bedside commode with Stand-by Assistance.                       Time Tracking:     OT Date of Treatment: 03/27/19  OT Start Time: 1255  OT Stop Time: 1400  OT Total Time (min): 65 min    Billable Minutes:Self Care/Home Management 65    Daja Saucedo OTR/L  3/27/2019

## 2019-03-27 NOTE — PT/OT/SLP PROGRESS
Speech Language Pathology      Dorianyudelka PatiñoYahir  MRN: [id number]    Patient not seen today secondary to Unavailable (Comment). Pt working with PT during attempt this date. Will follow-up 3/28/19.    CALEB Whitaker-SLP

## 2019-03-27 NOTE — PLAN OF CARE
Problem: Physical Therapy Goal  Goal: Physical Therapy Goal  Goals to be met by: 19    Patient will increase functional independence with mobility by performin. Gait x 50 feet with rolling walker or least restrictive assistive device and supervision.  2. Ascend/descend curb step with rolling walker or least restrictive assistive device and supervision.   3. Sit<>stand with rolling walker or least restrictive assistive device and supervision.   4. Supine<>sit without use of hospital bed features and independence.        Pt progressing well towards goals, sit to stand CGA, amb'd 150' with RW and CGA, another 150' without AD with  Min.A for balance (LOB x 5). Recommend cont PT in inpt REHAB to return to prior independent level.

## 2019-03-28 LAB
ALBUMIN SERPL BCP-MCNC: 3.9 G/DL (ref 3.5–5.2)
ALP SERPL-CCNC: 47 U/L (ref 55–135)
ALT SERPL W/O P-5'-P-CCNC: 141 U/L (ref 10–44)
ANION GAP SERPL CALC-SCNC: 7 MMOL/L (ref 8–16)
AST SERPL-CCNC: 112 U/L (ref 10–40)
BASOPHILS # BLD AUTO: 0.02 K/UL (ref 0–0.2)
BASOPHILS NFR BLD: 0.4 % (ref 0–1.9)
BILIRUB SERPL-MCNC: 0.6 MG/DL (ref 0.1–1)
BUN SERPL-MCNC: 16 MG/DL (ref 6–20)
CALCIUM SERPL-MCNC: 8.9 MG/DL (ref 8.7–10.5)
CHLORIDE SERPL-SCNC: 106 MMOL/L (ref 95–110)
CO2 SERPL-SCNC: 27 MMOL/L (ref 23–29)
CREAT SERPL-MCNC: 1.1 MG/DL (ref 0.5–1.4)
DIFFERENTIAL METHOD: ABNORMAL
EOSINOPHIL # BLD AUTO: 0.2 K/UL (ref 0–0.5)
EOSINOPHIL NFR BLD: 3.6 % (ref 0–8)
ERYTHROCYTE [DISTWIDTH] IN BLOOD BY AUTOMATED COUNT: 14.5 % (ref 11.5–14.5)
EST. GFR  (AFRICAN AMERICAN): >60 ML/MIN/1.73 M^2
EST. GFR  (NON AFRICAN AMERICAN): >60 ML/MIN/1.73 M^2
GLUCOSE SERPL-MCNC: 84 MG/DL (ref 70–110)
HCT VFR BLD AUTO: 34 % (ref 40–54)
HGB BLD-MCNC: 11.1 G/DL (ref 14–18)
LYMPHOCYTES # BLD AUTO: 2 K/UL (ref 1–4.8)
LYMPHOCYTES NFR BLD: 42.3 % (ref 18–48)
MAGNESIUM SERPL-MCNC: 2.3 MG/DL (ref 1.6–2.6)
MCH RBC QN AUTO: 29.6 PG (ref 27–31)
MCHC RBC AUTO-ENTMCNC: 32.6 G/DL (ref 32–36)
MCV RBC AUTO: 91 FL (ref 82–98)
MONOCYTES # BLD AUTO: 0.3 K/UL (ref 0.3–1)
MONOCYTES NFR BLD: 5.5 % (ref 4–15)
NEUTROPHILS # BLD AUTO: 2.3 K/UL (ref 1.8–7.7)
NEUTROPHILS NFR BLD: 48 % (ref 38–73)
PHOSPHATE SERPL-MCNC: 4 MG/DL (ref 2.7–4.5)
PLATELET # BLD AUTO: 191 K/UL (ref 150–350)
PMV BLD AUTO: 9.3 FL (ref 9.2–12.9)
POTASSIUM SERPL-SCNC: 3.4 MMOL/L (ref 3.5–5.1)
PROT SERPL-MCNC: 7 G/DL (ref 6–8.4)
RBC # BLD AUTO: 3.75 M/UL (ref 4.6–6.2)
SODIUM SERPL-SCNC: 140 MMOL/L (ref 136–145)
WBC # BLD AUTO: 4.77 K/UL (ref 3.9–12.7)

## 2019-03-28 PROCEDURE — 99231 PR SUBSEQUENT HOSPITAL CARE,LEVL I: ICD-10-PCS | Mod: ,,, | Performed by: INTERNAL MEDICINE

## 2019-03-28 PROCEDURE — 94761 N-INVAS EAR/PLS OXIMETRY MLT: CPT

## 2019-03-28 PROCEDURE — 85025 COMPLETE CBC W/AUTO DIFF WBC: CPT

## 2019-03-28 PROCEDURE — 25000003 PHARM REV CODE 250: Performed by: INTERNAL MEDICINE

## 2019-03-28 PROCEDURE — 83735 ASSAY OF MAGNESIUM: CPT

## 2019-03-28 PROCEDURE — 99231 SBSQ HOSP IP/OBS SF/LOW 25: CPT | Mod: ,,, | Performed by: INTERNAL MEDICINE

## 2019-03-28 PROCEDURE — 80053 COMPREHEN METABOLIC PANEL: CPT

## 2019-03-28 PROCEDURE — 94664 DEMO&/EVAL PT USE INHALER: CPT

## 2019-03-28 PROCEDURE — 11000001 HC ACUTE MED/SURG PRIVATE ROOM

## 2019-03-28 PROCEDURE — 97116 GAIT TRAINING THERAPY: CPT

## 2019-03-28 PROCEDURE — 97530 THERAPEUTIC ACTIVITIES: CPT

## 2019-03-28 PROCEDURE — 97110 THERAPEUTIC EXERCISES: CPT

## 2019-03-28 PROCEDURE — 84100 ASSAY OF PHOSPHORUS: CPT

## 2019-03-28 PROCEDURE — 99900035 HC TECH TIME PER 15 MIN (STAT)

## 2019-03-28 PROCEDURE — 36415 COLL VENOUS BLD VENIPUNCTURE: CPT

## 2019-03-28 RX ORDER — POTASSIUM CHLORIDE 20 MEQ/1
20 TABLET, EXTENDED RELEASE ORAL ONCE
Status: COMPLETED | OUTPATIENT
Start: 2019-03-28 | End: 2019-03-28

## 2019-03-28 RX ADMIN — LIOTHYRONINE SODIUM 5 MCG: 5 TABLET ORAL at 08:03

## 2019-03-28 RX ADMIN — POTASSIUM CHLORIDE 20 MEQ: 1500 TABLET, EXTENDED RELEASE ORAL at 08:03

## 2019-03-28 RX ADMIN — LEVOTHYROXINE SODIUM 150 MCG: 75 TABLET ORAL at 06:03

## 2019-03-28 NOTE — PT/OT/SLP PROGRESS
Speech Language Pathology Treatment    Patient Name:  Dorian Murray   MRN:  [id number]  Admitting Diagnosis: Myxedema coma    Recommendations:                 General Recommendations:     1. Recommend Neurology consult   2. Recommend visual perceptual assessment   3. Speech pathology 5-6x/week    Diet recommendations:  Regular, Liquid Diet Level: Thin   Aspiration Precautions: HOB to 90 degrees and Remain upright 30 minutes post meal   General Precautions: Standard, aphasia(Cognitive communication deficits, dysarthria, visual perceptual deficits)  Communication strategies:    1. Reduce distractions   2. Simple commands  3. Repetition of lengthy information  4. Tactile cues to help identify objects    Subjective     Pain/Comfort:       Objective:     Has the patient been evaluated by SLP for swallowing?   Yes  Keep patient NPO? No   Current Respiratory Status:        EDUCATION LEVEL: Pt reports a HS education, follow by  service. He reports no prior learning disabilities.    COGNITIVE STATUS: Alert, mild lethargic and slow responses. Oriented to person, place, date and reason for hospitalization with 100% acc. Mildly distractible. Able to sustain general attention to simple to complex cognitive tasks in a quiet environment with reduction of distractions for 36 min. However, frequent loss of concentration and forward progression of task with environmental distractions (ie loss of task when phone rang, loss of task with people were talking outside the door.) Recent memory not tested this session, focus of eval on language, reading, writing and naming tasks.     RECEPTIVE AND EXPRESSIVE LANGUAGE: Auditory Comprehension: Pt able to follow simple 1 step commands, not involving objects, with 100% acc. Pt able to answer simple to mod complex yes/no questions with 100% acc. As length and complexity of information increased, pt demonstrated processing delays, sequencing errors. Complex syntax and  "prepositions caused errors. On commands involving use of objects (ie "point to the pen, then the book") pt repeatedly stated "I cannot tell what that is" for objects on tray table in front of him. He was not able to consistently identify simple written letters, pictures objects or real objects during command following or verbal expression/naming tasks. VISUAL PERCEPTUAL: Overt signs of visual perceptual and or lyla spatial neglect noted during this exam, difficult to assess. Pt noted to turn head to the left , tilt head, open eyes wide. He had to touch object to be able to name them and then was only inconsistently accurate. Pt stating "really I am guessing by how it feels." Verbal expression: Verbal expression is fluent, and pt is able to express basic wants and needs 100% of time. Struggled to express higher level wants and needs, offer descriptions, with word finding and dcr word fluency noted. Poor performance of object and picture object naming tasks, impacted by possible visual issues. Needs ongoing assessment. Anomic blocks and hesitations noted on verbal tasks. READING COMPREHENSION: pt had difficulty reading large print letters, numbers, identifying pictures. He repeatedly stated "I cannot see it" or "I cannot tell what that is" WRITTEN EXPRESSION: Pt able to write his first name to command with 100% acc. He was able to write simple familiar vocabulary words to command with 60% acc. Errors characterized by misspelling, letter substitutions (ie bog for dog.) He was also note to write on a slant up the page, begin in the middle of the page.    Assessment:     Dorian PatiñoVeronaYahir is a 29 y.o. male with an SLP diagnosis of Dysarthria and Cognitive-Communication Impairment.  He presents with signs concerning for visual disturbance during language tasks    Goals:   Multidisciplinary Problems     SLP Goals        Problem: SLP Goal    Goal Priority Disciplines Outcome   SLP Goal     SLP Ongoing " (interventions implemented as appropriate)   Description:  1. Pt will be nolberto to consume a regular diet with thin liquids with no signs of dysphagia given no assistance  2. Evaluation of cognitive communication, motor speech and receptive and expressive language deficits     3. Pt will be able to answer complex yes/no questions with 100% acc given mild repetition and verbal cues  4. Pt will be able to follow 2-3 step commands with simple familiar objects with 50% acc given max verbal and tactile cues  5. Pt will be able to name familiar pictured and actual objects with 50% acc given max visual and tactile cues   6. Pt will be able to read simple words and short phrases with 75% acc given max verbal, visual and auditory cues for letter recognition, organized scanning, site reading strategies                    Plan:     · Patient to be seen:  5 x/week, 6 x/week   · Plan of Care expires:  04/12/19  · Plan of Care reviewed with:  patient   · SLP Follow-Up:  Yes       Discharge recommendations:  rehabilitation facility       Time Tracking:     SLP Treatment Date:   03/28/19  Speech Start Time:  1120  Speech Stop Time:  1203     Speech Total Time (min):  43 min    Billable Minutes: Eval 43 min     Inna Yusuf CCC-SLP  03/28/2019

## 2019-03-28 NOTE — PROGRESS NOTES
[facility] Hospital Medicine  Progress Note    Patient Name: Dorian Murray  MRN: [id number]  Patient Class: IP- Inpatient   Admission Date: 3/23/2019  Length of Stay: 5 days  Attending Physician: Taina Mcbride MD  Primary Care Provider: Primary Doctor No        Subjective:     Principal Problem:Myxedema coma    HPI:  Mr. Murray is a 29/M with reported PMH of Hashimoto's who presented to [facility] ED via EMS after having been found altered on the morning of 03/23. History obtained from the patient's mother as well as ED notes. Reportedly he was found down in his car ([address] [employer]) outside a gas station; had been noted on security cameras at gas station around 0345. Supposedly was last seen normal by his ex-wife the evening of 03/22 when he took his daughter to dinner. EMS arrived after being notified by gas station personnel after his car was stationary and still running. EMS arrived and bagged the patient, and he was transported to [facility] ED. He was subsequently intubated for airway protection and hypoxic respiratory failure. Reportedly, his carboxyhemoglobin on ABG was measured at 7.2.    Patient's mother reports that he has a history of Hashimoto's; she has thyroid issues as well. Supposedly he had been on thyroid medication in the remote past, but she believes he has not been taking his medication for a significant period of time.    Hospital Course:  After admission, Mr. Patiño was noted to have improving mental status and was able to be extubated. He was noted to have continued altered mental status; TSH and free T4 resulted showing significantly elevated TSH and very low T4. He was subsequently started on levothyroxine, cytomel and hydrocortisone for potential adrenal insufficiency with concern for myxedema coma. Hydrocortisone was stopped after cortisol came back normal. Mental status improved slowly but steadily. He was evaluated by PT/OT/ST. PT/OT  recommended IPR so SW was consulted. ST noted visual disturbances so further imaging was done with MRI, which showed pituitary hyperplasia. Discussed with Endocrinology at [facility], Dr. Paul and this is likely due to myxedema coma and severely uncontrolled hypothyroidism.     Interval History:  Pt seen and examined at bedside. He continues to improve. Still complains of blurry vision.     Review of Systems   Eyes: Positive for visual disturbance.   Respiratory: Negative for cough and shortness of breath.    Cardiovascular: Negative for chest pain and palpitations.   Gastrointestinal: Negative for abdominal pain, nausea and vomiting.     Objective:     Vital Signs (Most Recent):  Temp: 98.9 °F (37.2 °C) (03/28/19 1114)  Pulse: 79 (03/28/19 1200)  Resp: 20 (03/28/19 1114)  BP: (!) 106/56 (03/28/19 1114)  SpO2: (!) 94 % (03/28/19 1114) Vital Signs (24h Range):  Temp:  [97.6 °F (36.4 °C)-98.9 °F (37.2 °C)] 98.9 °F (37.2 °C)  Pulse:  [46-79] 79  Resp:  [16-20] 20  SpO2:  [94 %-96 %] 94 %  BP: ()/(50-75) 106/56     Weight: 97.8 kg (215 lb 9.8 oz)  Body mass index is 32.78 kg/m².  No intake or output data in the 24 hours ending 03/28/19 1310   Physical Exam   Constitutional: He is oriented to person, place, and time. He appears well-developed and well-nourished. No distress.   HENT:   Head: Normocephalic and atraumatic.   Cardiovascular: Normal rate, regular rhythm, S1 normal, S2 normal and intact distal pulses.   Pulmonary/Chest: Effort normal and breath sounds normal. No respiratory distress.   Abdominal: Soft. He exhibits no distension. There is no tenderness.   Musculoskeletal: Normal range of motion. He exhibits no edema.   Neurological: He is alert and oriented to person, place, and time.   Awake and responsive to questions with slowed speech is improving   Skin: Skin is warm and dry.   Nursing note and vitals reviewed.    Significant Labs:   CBC:  Recent Labs   Lab 03/26/19  0751 03/27/19  0546 03/28/19  0549    WBC 5.89 4.68 4.77   HGB 9.7* 10.8* 11.1*   HCT 30.1* 33.0* 34.0*    173 191   GRAN 51.9  3.1 53.1  2.5 48.0  2.3   LYMPH 40.1  2.4 39.1  1.8 42.3  2.0   MONO 5.1  0.3 3.6*  0.2* 5.5  0.3   EOS 0.1 0.2 0.2   BASO 0.04 0.03 0.02      CMP:  Recent Labs   Lab 03/26/19  0750 03/27/19  0546 03/28/19  0549    139 140   K 3.0* 3.8 3.4*    106 106   CO2 27 27 27   BUN 12 13 16   CREATININE 1.2 1.2 1.1   GLU 78 84 84   CALCIUM 8.3* 8.9 8.9   MG 2.2 2.4 2.3   PHOS 2.8 3.5 4.0   ALKPHOS 44* 46* 47*   * 143* 112*   * 171* 141*   BILITOT 0.5 0.6 0.6   PROT 6.3 6.9 7.0   ALBUMIN 3.6 3.9 3.9   ANIONGAP 6* 6* 7*     Recent Labs   Lab 03/23/19  1335 03/23/19  1832  03/26/19  0750 03/27/19  0546   TSH  --   --   --   --  >375.000*   FREET4  --   --   --   --  0.47*   CORTISOL 23.80  --   --   --   --    CPK  --   --    < > 3611*  --    LACTATE 3.8* 3.0*  --   --   --     < > = values in this interval not displayed.     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  No identifiable focal lesion within the pituitary gland    Assessment/Plan:      * Myxedema coma  - Suspect primarily myxedema coma; myxedema score 45-60 (dependent on whether stool burden on imaging is counted as decreased intestinal motility; normal temperature, HR, no specific precipitating event, but did have obtundation on initial presentation, QT prolongation, hypoxemia and elevated creatinine).  - TSH >100, FT4 < 0.25 on initial presentation; cortisol 23.8. Received IV levothyroxine and cytomel BID  - Discussed with endocrinology, Dr. Paul via transfer center.   - FT4 improving. TSH still markedly elevated but is likely down trending (lab cutoff different at OSH so TSH appears higher but is not)  - Discontinued hydrocortisone given cortisol level stable.  - Per Dr. Paul, will continue levothyroxine 150 mcg qd and cytomel 5 mcg BID until f/u with outpatient  endocrinology.  - Will check weekly FT4 and monthly TSH  - PT/OT recommending IPR. SW consulted    Visual disturbance  2/2 pituitary hyperplasia seen on MRI.   D/w endocrinology, this is likely due to severe hypothyroidism and is expected to improve with time and treatment  Discussed this with patient.      Anemia  - Mild normocytic anemia with underlying thyroid disease.  - B12/folate WNL, Iron studies WNL    Non-traumatic rhabdomyolysis  - 2/2 myxedema coma  - Initial ,000s. Now down to 3600  - IVF stopped with adequate PO intake    Hashimoto's thyroiditis  Continue levothyroxine 150 mcg qd and cytomel 5 mcg BID    Transaminitis  - Secondary to rhabdomyolysis and myxedema coma  - Down trending.      VTE Risk Mitigation (From admission, onward)        Ordered     IP VTE LOW RISK PATIENT  Once      03/23/19 1228     Place sequential compression device  Until discontinued      03/23/19 1228        Dispo: pending IPR approval      Taina Mcbride MD  Department of Hospital Medicine   [facility]

## 2019-03-28 NOTE — PLAN OF CARE
Problem: Adult Inpatient Plan of Care  Goal: Absence of Hospital-Acquired Illness or Injury    Intervention: Identify and Manage Fall Risk     03/28/19 0549   Optimize Balance and Safe Activity   Safety Promotion/Fall Prevention assistive device/personal item within reach;bed alarm set;commode/urinal/bedpan at bedside;Fall Risk reviewed with patient/family;medications reviewed;nonskid shoes/socks when out of bed;side rails raised x 2         Problem: Fall Injury Risk  Goal: Absence of Fall and Fall-Related Injury    Intervention: Identify and Manage Contributors to Fall Injury Risk     03/28/19 0549   Identify and Manage Contributors to Fall Injury Risk   Self-Care Promotion independence encouraged   Manage Acute Allergic Reaction   Medication Review/Management medications reviewed         Problem: Skin Injury Risk Increased  Goal: Skin Health and Integrity    Intervention: Optimize Skin Protection     03/28/19 0549   Prevent Additional Skin Injury   Pressure Reduction Techniques frequent weight shift encouraged

## 2019-03-28 NOTE — PLAN OF CARE
Problem: Adult Inpatient Plan of Care  Goal: Plan of Care Review  Outcome: Ongoing (interventions implemented as appropriate)  Self-administers breathing exercisers, no SOB, no productive cough.

## 2019-03-28 NOTE — PLAN OF CARE
Problem: SLP Goal  Goal: SLP Goal  1. Pt will be nolberto to consume a regular diet with thin liquids with no signs of dysphagia given no assistance  2. Evaluation of cognitive communication, motor speech and receptive and expressive language deficits     3. Pt will be able to answer complex yes/no questions with 100% acc given mild repetition and verbal cues  4. Pt will be able to follow 2-3 step commands with simple familiar objects with 50% acc given max verbal and tactile cues  5. Pt will be able to name familiar pictured and actual objects with 50% acc given max visual and tactile cues   6. Pt will be able to read simple words and short phrases with 75% acc given max verbal, visual and auditory cues for letter recognition, organized scanning, site reading strategies  Outcome: Ongoing (interventions implemented as appropriate)  Pt seen for ongoing evaluation and treatment of receptive and expressive language, reading comprehension.    Comments: Speech to follow 5-6x/week

## 2019-03-28 NOTE — PLAN OF CARE
Problem: Occupational Therapy Goal  Goal: Occupational Therapy Goal  Goals to be met by: 4/8/19     Patient will increase functional independence with ADLs by performing:    UE Dressing with Stand-by Assistance. -MET 3/27/19     REVISED GOAL: UB dressing with SPV including clothing retrieval   LE Dressing with Stand-by Assistance. -MET 3/27/19     REVISED GOAL: LB dressing with SPV  Grooming while standing at sink with Stand-by Assistance.  MET 3/28/2019     REVISED GOAL:  Grooming while standing at sink with supervision.  Toileting from bedside commode with Stand-by Assistance for hygiene and clothing management.   Toilet transfer to bedside commode with Stand-by Assistance.        Pt is making progress towards goals and would continue to benefit from skilled OT services to increase independence with ADLs.  He remains strong candidate for rehab.    BETTY Nguyen  3/28/2019

## 2019-03-28 NOTE — PLAN OF CARE
Problem: Adult Inpatient Plan of Care  Goal: Plan of Care Review  Outcome: Ongoing (interventions implemented as appropriate)  Plan of care reviewed with patient, VSS.  Telemetry monitor maintained.  No complaints of pain this shift.  Patient up to bedside commode, patient repositions self.  Patient ambulates with stand by assist.  Urinal at bedside.  Bed lowered and locked, call bell within reach.  Purposeful rounding completed.  No needs at this time.  Will continue to monitor.

## 2019-03-28 NOTE — PT/OT/SLP PROGRESS
Occupational Therapy   Treatment    Name: Dorian Murray  MRN: [id number]  Admitting Diagnosis:  Myxedema coma       Recommendations:     Discharge Recommendations: rehabilitation facility  Discharge Equipment Recommendations:  (TBD pending progress)  Barriers to discharge:  Decreased caregiver support(pt is homeless)    Assessment:     Dorian Murray is a 29 y.o. male with a medical diagnosis of Myxedema coma.  He presents with the following performance deficits affecting function: weakness, impaired self care skills, impaired functional mobilty, gait instability, impaired cognition, impaired balance, decreased lower extremity function, decreased safety awareness, decreased coordination.  Pt demonstrated improvement with grooming while standing at sink performing task with SBA and RW.  While ambulating pt required CGA and RW.  Pt displayed one instance of not noticing an obstacle in his way until he bumped it (corner of chair) when walking within room, but no instances of LOB noted.  Pt was able to have conversation and answer majority of questions asked by therapist, but continues to demonstrate difficulty reading words and requires increased time for piecing letters together.  Pt is making progress with ADLs and mobility, but continues to display some cognitive deficits impacting his ability to perform daily routine as before.  Pt would continue to benefit from skilled OT services to address problems listed above and maximize independence with ADLs.  Rehab is recommended upon d/c from acute care to further address deficits and help pt improve overall functional independence.      Rehab Prognosis:  Good; patient would benefit from acute skilled OT services to address these deficits and reach maximum level of function.       Plan:     Patient to be seen 6 x/week to address the above listed problems via self-care/home management, therapeutic activities, therapeutic exercises  · Plan of  Care Expires: 04/08/19  · Plan of Care Reviewed with: patient    Subjective     Pain/Comfort:  · Pain Rating 1: 0/10  · Pain Rating Post-Intervention 1: 0/10    Objective:     Communicated with: RN prior to session.  Patient found up in chair with peripheral IV (IV not connected) upon OT entry to room.    General Precautions: Standard, fall, vision impaired   Orthopedic Precautions:N/A   Braces: N/A     Occupational Performance:     Bed Mobility:    · Not assessed 2* pt up in chair upon arrival.    Functional Mobility/Transfers:  · Patient completed Sit <> Stand Transfer with contact guard assistance  with  rolling walker x 5 trials from EOB.  · Toilet:  CGA and RW x 1 trial from BSC  · Functional Mobility: Pt ambulated ~50 ft within room with CGA and RW.  Pt failed to notice RW touching leg of chair on L side; however, with slight touch of RW able to self correct and no instances of LOB noted.    Activities of Daily Living:  · Grooming: stand by assistance for washing hands while standing at sink with RW.      [facility] 6 Click ADL: 19    Treatment & Education:  *Pt ambulated within room to address coordination, balance, and endurance needed for ADLs in standing and mobility.   *Pt performed grooming task while standing at sink.  *Pt stood to perform activities to address postural control, coordination and balance needed for ADLs in standing:  -Forward toe taps: 1 set x 15 reps on each side; 1 set x 15 reps alternating right and left  -Lateral toe taps: 1 set x 15 reps on each side; 1 set x 15 reps alternating right and left  -Pt held onto RW with one hand then reached forward to various locations and heights to give OT high five: 1 set x 15 reps on each side  *OT asked orientation questions to assess cognition of pt.  OT then asked pt to name months of year and days of week.  Pt able to identify 7/7 days and 12/12 months.  Pt discussed his work as a  and talked a little bit about his family.    *Pt practiced  reading words from a text message and on blood pressure cuff.  Pt said each letter out loud and then stated the word.  He reported having difficulty reading and thought it was a visual issue, but denies blurred vision.  Pt states he does not have trouble remembering words or knowing what he wants to say, but thinks his speech is different than before.   *[place] [facility]  *POC reviewed with pt       Patient left up in chair with call button in reachEducation:      GOALS:   Multidisciplinary Problems     Occupational Therapy Goals        Problem: Occupational Therapy Goal    Goal Priority Disciplines Outcome Interventions   Occupational Therapy Goal     OT, PT/OT Ongoing (interventions implemented as appropriate)    Description:  Goals to be met by: 4/8/19     Patient will increase functional independence with ADLs by performing:    UE Dressing with Stand-by Assistance. -MET 3/27/19     REVISED GOAL: UB dressing with SPV including clothing retrieval   LE Dressing with Stand-by Assistance. -MET 3/27/19     REVISED GOAL: LB dressing with SPV  Grooming while standing at sink with Stand-by Assistance.  MET 3/28/2019     REVISED GOAL:  Grooming while standing at sink with supervision.  Toileting from bedside commode with Stand-by Assistance for hygiene and clothing management.   Toilet transfer to bedside commode with Stand-by Assistance.                        Time Tracking:     OT Date of Treatment: 03/28/19  OT Start Time: 1306  OT Stop Time: 1334  OT Total Time (min): 28 min    Billable Minutes:Therapeutic Activity 28    BETTY Nguyen  3/28/2019

## 2019-03-28 NOTE — SUBJECTIVE & OBJECTIVE
Interval History:  Pt seen and examined at bedside. He continues to improve. Still complains of blurry vision.     Review of Systems   Eyes: Positive for visual disturbance.   Respiratory: Negative for cough and shortness of breath.    Cardiovascular: Negative for chest pain and palpitations.   Gastrointestinal: Negative for abdominal pain, nausea and vomiting.     Objective:     Vital Signs (Most Recent):  Temp: 98.9 °F (37.2 °C) (03/28/19 1114)  Pulse: 79 (03/28/19 1200)  Resp: 20 (03/28/19 1114)  BP: (!) 106/56 (03/28/19 1114)  SpO2: (!) 94 % (03/28/19 1114) Vital Signs (24h Range):  Temp:  [97.6 °F (36.4 °C)-98.9 °F (37.2 °C)] 98.9 °F (37.2 °C)  Pulse:  [46-79] 79  Resp:  [16-20] 20  SpO2:  [94 %-96 %] 94 %  BP: ()/(50-75) 106/56     Weight: 97.8 kg (215 lb 9.8 oz)  Body mass index is 32.78 kg/m².  No intake or output data in the 24 hours ending 03/28/19 1310   Physical Exam   Constitutional: He is oriented to person, place, and time. He appears well-developed and well-nourished. No distress.   HENT:   Head: Normocephalic and atraumatic.   Cardiovascular: Normal rate, regular rhythm, S1 normal, S2 normal and intact distal pulses.   Pulmonary/Chest: Effort normal and breath sounds normal. No respiratory distress.   Abdominal: Soft. He exhibits no distension. There is no tenderness.   Musculoskeletal: Normal range of motion. He exhibits no edema.   Neurological: He is alert and oriented to person, place, and time.   Awake and responsive to questions with slowed speech is improving   Skin: Skin is warm and dry.   Nursing note and vitals reviewed.    Significant Labs:   CBC:  Recent Labs   Lab 03/26/19  0751 03/27/19  0546 03/28/19  0549   WBC 5.89 4.68 4.77   HGB 9.7* 10.8* 11.1*   HCT 30.1* 33.0* 34.0*    173 191   GRAN 51.9  3.1 53.1  2.5 48.0  2.3   LYMPH 40.1  2.4 39.1  1.8 42.3  2.0   MONO 5.1  0.3 3.6*  0.2* 5.5  0.3   EOS 0.1 0.2 0.2   BASO 0.04 0.03 0.02      CMP:  Recent Labs   Lab  03/26/19  0750 03/27/19  0546 03/28/19  0549    139 140   K 3.0* 3.8 3.4*    106 106   CO2 27 27 27   BUN 12 13 16   CREATININE 1.2 1.2 1.1   GLU 78 84 84   CALCIUM 8.3* 8.9 8.9   MG 2.2 2.4 2.3   PHOS 2.8 3.5 4.0   ALKPHOS 44* 46* 47*   * 143* 112*   * 171* 141*   BILITOT 0.5 0.6 0.6   PROT 6.3 6.9 7.0   ALBUMIN 3.6 3.9 3.9   ANIONGAP 6* 6* 7*     Recent Labs   Lab 03/23/19  1335 03/23/19  1832  03/26/19  0750 03/27/19  0546   TSH  --   --   --   --  >375.000*   FREET4  --   --   --   --  0.47*   CORTISOL 23.80  --   --   --   --    CPK  --   --    < > 3611*  --    LACTATE 3.8* 3.0*  --   --   --     < > = values in this interval not displayed.     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  No identifiable focal lesion within the pituitary gland

## 2019-03-28 NOTE — PT/OT/SLP PROGRESS
Physical Therapy Treatment    Patient Name:  Dorian Murray   MRN:  [id number]    Recommendations:     Discharge Recommendations:  rehabilitation facility   Discharge Equipment Recommendations: (TBD, pending progress)   Barriers to discharge: Inaccessible home and Decreased caregiver support    Assessment:     Dorian Murray is a 29 y.o. male admitted with a medical diagnosis of Myxedema coma.  He presents with the following impairments/functional limitations:  weakness, impaired self care skills, impaired functional mobilty, gait instability, impaired balance, visual deficits, decreased lower extremity function, decreased safety awareness, impaired coordination, impaired cognition ;pt with good mobility today, amb'd with and without AD, working on balance act's. Pt req'd min.A for LOB at times. Recommending cont PT in inpt REHAB to help pt return to his prior level of function, which was Independent and working as a  for a car dealership. Pt also has a 6yr old daughter whom he needs to help take care of. Pt would be an excellent REHAB candidate and could definitely tolerate 3 hours of therapy a day.    Rehab Prognosis: Good; patient would benefit from acute skilled PT services to address these deficits and reach maximum level of function.    Recent Surgery: * No surgery found *      Plan:     During this hospitalization, patient to be seen 6 x/week to address the identified rehab impairments via gait training, therapeutic activities, therapeutic exercises, neuromuscular re-education and progress toward the following goals:    · Plan of Care Expires:  04/08/19    Subjective     Chief Complaint: pt mostly c/o feet feeling dry , painful when walking, though did not rate pain  Patient/Family Comments/goals: pt eager to work with therapy  Pain/Comfort:  · Pain Rating 1: 0/10(pt did not c/o pain, though did report his feet felt very dry)  · Pain Rating Post-Intervention 1:  0/10      Objective:     Communicated with nurse prior to session.  Patient found HOB elevated with peripheral IV upon PT entry to room.     General Precautions: Standard, fall, aphasia, vision impaired   Orthopedic Precautions:N/A   Braces: N/A     Functional Mobility:  · Bed Mobility:     · Supine to Sit: supervision  · Transfers:     · Sit to Stand:  stand by assistance with rolling walker  · Gait: amb'd 150' with RW and CGA, then another 150' without AD with CGA/min.A, pt with LOB x 3 today      AM-PAC 6 CLICK MOBILITY  Turning over in bed (including adjusting bedclothes, sheets and blankets)?: 3  Sitting down on and standing up from a chair with arms (e.g., wheelchair, bedside commode, etc.): 3  Moving from lying on back to sitting on the side of the bed?: 3  Moving to and from a bed to a chair (including a wheelchair)?: 3  Need to walk in hospital room?: 3  Climbing 3-5 steps with a railing?: 2  Basic Mobility Total Score: 17       Therapeutic Activities and Exercises:   pt perf'd standing LE ex's of heelraises, hip flex, hip abd, hs curls, mini squats x 10 ea.   perf'd high level balance act's in hallway consisting of tandem gt, sidestepping, retro amb. With min.A for balance (1 episode was mod/max.A to regain balance).     Patient left up in chair with all lines intact, call button in reach and nurse notified..    GOALS:   Multidisciplinary Problems     Physical Therapy Goals        Problem: Physical Therapy Goal    Goal Priority Disciplines Outcome Goal Variances Interventions   Physical Therapy Goal     PT, PT/OT      Description:  Goals to be met by: 19    Patient will increase functional independence with mobility by performin. Gait x 50 feet with rolling walker or least restrictive assistive device and supervision.  2. Ascend/descend curb step with rolling walker or least restrictive assistive device and supervision.   3. Sit<>stand with rolling walker or least restrictive assistive device  and supervision.   4. Supine<>sit without use of hospital bed features and independence.                         Time Tracking:     PT Received On: 03/28/19  PT Start Time: 1005     PT Stop Time: 1043  PT Total Time (min): 38 min     Billable Minutes: Gait Training 18, Therapeutic Activity 10 and Therapeutic Exercise 10    Treatment Type: Treatment  PT/PTA: PTA     PTA Visit Number: 3     Hilda Puente, PTA  03/28/2019

## 2019-03-29 VITALS
WEIGHT: 215.63 LBS | HEART RATE: 69 BPM | DIASTOLIC BLOOD PRESSURE: 68 MMHG | OXYGEN SATURATION: 96 % | BODY MASS INDEX: 32.68 KG/M2 | HEIGHT: 68 IN | TEMPERATURE: 98 F | RESPIRATION RATE: 18 BRPM | SYSTOLIC BLOOD PRESSURE: 98 MMHG

## 2019-03-29 PROBLEM — M62.82 NON-TRAUMATIC RHABDOMYOLYSIS: Status: RESOLVED | Noted: 2019-03-23 | Resolved: 2019-03-29

## 2019-03-29 PROBLEM — E03.5 MYXEDEMA COMA: Status: RESOLVED | Noted: 2019-03-23 | Resolved: 2019-03-29

## 2019-03-29 PROCEDURE — 99238 HOSP IP/OBS DSCHRG MGMT 30/<: CPT | Mod: ,,, | Performed by: INTERNAL MEDICINE

## 2019-03-29 PROCEDURE — 99238 PR HOSPITAL DISCHARGE DAY,<30 MIN: ICD-10-PCS | Mod: ,,, | Performed by: INTERNAL MEDICINE

## 2019-03-29 PROCEDURE — 94761 N-INVAS EAR/PLS OXIMETRY MLT: CPT

## 2019-03-29 PROCEDURE — 99900035 HC TECH TIME PER 15 MIN (STAT)

## 2019-03-29 PROCEDURE — 97110 THERAPEUTIC EXERCISES: CPT

## 2019-03-29 PROCEDURE — 97530 THERAPEUTIC ACTIVITIES: CPT

## 2019-03-29 PROCEDURE — 94799 UNLISTED PULMONARY SVC/PX: CPT

## 2019-03-29 PROCEDURE — 94664 DEMO&/EVAL PT USE INHALER: CPT

## 2019-03-29 PROCEDURE — 97116 GAIT TRAINING THERAPY: CPT

## 2019-03-29 PROCEDURE — 25000003 PHARM REV CODE 250: Performed by: INTERNAL MEDICINE

## 2019-03-29 RX ADMIN — LIOTHYRONINE SODIUM 5 MCG: 5 TABLET ORAL at 09:03

## 2019-03-29 RX ADMIN — LEVOTHYROXINE SODIUM 150 MCG: 75 TABLET ORAL at 06:03

## 2019-03-29 NOTE — PLAN OF CARE
Problem: Adult Inpatient Plan of Care  Goal: Plan of Care Review  Outcome: Ongoing (interventions implemented as appropriate)  Patient on room air saturation 96% therapy done with good effort.

## 2019-03-29 NOTE — NURSING
Called and gave report to Julia at [facility]. Pt going to room 412. Awaiting transport. Will continue to monitor.

## 2019-03-29 NOTE — PLAN OF CARE
[facility] still unable to accept patient due to staffing - spoke with patient & mother requesting alternative choices - awaiting response

## 2019-03-29 NOTE — PLAN OF CARE
Spoke with Astrid from [facility] - due to staffing issues they may not be able to recieve patient today - awaiting response

## 2019-03-29 NOTE — DISCHARGE SUMMARY
[facility] Hospital Medicine  Discharge Summary      Patient Name: Dorian Murray  MRN: [id number]  Admission Date: 3/23/2019  Hospital Length of Stay: 6 days  Discharge Date and Time:  03/29/2019 10:08 AM  Attending Physician: Taina Mcbride MD   Discharging Provider: Taina Mcbride MD  Primary Care Provider: Primary Doctor No      HPI:   Mr. Murray is a 29/M with reported PMH of Hashimoto's who presented to [facility] ED via EMS after having been found altered on the morning of 03/23. History obtained from the patient's mother as well as ED notes. Reportedly he was found down in his car (2000 [employer]) outside a gas station; had been noted on security cameras at gas station around 0345. Supposedly was last seen normal by his ex-wife the evening of 03/22 when he took his daughter to dinner. EMS arrived after being notified by gas station personnel after his car was stationary and still running. EMS arrived and bagged the patient, and he was transported to [facility] ED. He was subsequently intubated for airway protection and hypoxic respiratory failure. Reportedly, his carboxyhemoglobin on ABG was measured at 7.2.    Patient's mother reports that he has a history of Hashimoto's; she has thyroid issues as well. Supposedly he had been on thyroid medication in the remote past, but she believes he has not been taking his medication for a significant period of time.    * No surgery found *      Hospital Course:   After admission, Mr. Patiño was noted to have improving mental status and was able to be extubated. He was noted to have continued altered mental status; TSH and free T4 resulted showing significantly elevated TSH and very low T4. He was subsequently started on levothyroxine, cytomel and hydrocortisone for potential adrenal insufficiency with concern for myxedema coma. Hydrocortisone was stopped after cortisol came back normal. Mental status improved slowly but  steadily. He was evaluated by PT/OT/ST. PT/OT recommended IPR so SW was consulted. ST noted visual disturbances so further imaging was done with MRI, which showed pituitary hyperplasia. Discussed with Endocrinology at [facility], Dr. Paul and this is likely due to myxedema coma and severely uncontrolled hypothyroidism. He continues to improve and is now stable for discharge to rehab.     Consults:   Consults (From admission, onward)        Status Ordering Provider     Inpatient consult to Pulmonary Critical Care  Once     Provider:  Crow Figueroa MD    Completed ENRRIQUE GUTIERREZ            Final Active Diagnoses:    Diagnosis Date Noted POA    Visual disturbance [H53.9] 03/27/2019 Yes    Anemia [D64.9] 03/24/2019 Yes    Transaminitis [R74.0] 03/23/2019 Yes    Hashimoto's thyroiditis [E06.3] 03/23/2019 Yes     Chronic      Problems Resolved During this Admission:    Diagnosis Date Noted Date Resolved POA    PRINCIPAL PROBLEM:  Myxedema coma [E03.5] 03/23/2019 03/29/2019 Yes    Acute respiratory failure with hypoxia [J96.01] 03/23/2019 03/27/2019 Yes    KLAUDIA (acute kidney injury) [N17.9] 03/23/2019 03/27/2019 Yes    Hypokalemia [E87.6] 03/23/2019 03/26/2019 Yes    Toxic effect of carbon monoxide, unintentional [T58.91XA] 03/23/2019 03/27/2019 Yes    Non-traumatic rhabdomyolysis [M62.82] 03/23/2019 03/29/2019 Yes       Discharged Condition: good    Disposition: Rehab Facility    Follow Up:  Follow-up Information     Primary care physician.    Why:  to establish care           Alex Paul MD.    Specialty:  Endocrinology  Why:  after discharge from rehab. Hospital follow up and establish care for Hashimoto's  Contact information:  [facility] [id number]  [phone]                 Patient Instructions:      Diet Adult Regular     Vital signs per facility protocol     Intake and output per facility protocol     Skin assessment every shift      Notify Physician     Order Specific Question  Answer Comments   Temperature (F) greater than 101    Systolic Blood Pressure SBP greater than or equal to 160    Systolic Blood Pressure SBP less than or equal to 90    Diastolic Blood Pressure DBP greater than or equal to 110    Diastolic Blood Pressure DBP less than or equal to 60    Pulse greater than or equal to 120    Pulse less than or equal to 50    Respirations Rate RR greater than or equal to 30    Respirations Rate RR less than or equal to 6    Urine output less than 60 over 2 hours   SPO2% less than 90      Full code     Pulse Oximetry       Significant Diagnostic Studies: Labs:   CMP   Recent Labs   Lab 03/28/19  0549      K 3.4*      CO2 27   GLU 84   BUN 16   CREATININE 1.1   CALCIUM 8.9   PROT 7.0   ALBUMIN 3.9   BILITOT 0.6   ALKPHOS 47*   *   *   ANIONGAP 7*   ESTGFRAFRICA >60   EGFRNONAA >60   , CBC   Recent Labs   Lab 03/28/19  0549   WBC 4.77   HGB 11.1*   HCT 34.0*       and All labs within the past 24 hours have been reviewed    Pending Diagnostic Studies:     None         Medications:  Reconciled Home Medications:      Medication List      START taking these medications    levothyroxine 150 MCG tablet  Commonly known as:  SYNTHROID  Take 1 tablet (150 mcg total) by mouth before breakfast.     liothyronine 5 MCG Tab  Commonly known as:  CYTOMEL  Take 1 tablet (5 mcg total) by mouth 2 (two) times daily.            Indwelling Lines/Drains at time of discharge:   Lines/Drains/Airways          None          Time spent on the discharge of patient: 30 minutes  Patient was seen and examined on the date of discharge and determined to be suitable for discharge.         Taina Mcbride MD  Department of Hospital Medicine  [facility]

## 2019-03-29 NOTE — PLAN OF CARE
Problem: Adult Inpatient Plan of Care  Goal: Plan of Care Review  Outcome: Ongoing (interventions implemented as appropriate)  Pt denied any c/o during this shift. Safety intact.  Iv site is benign.  Bed is low.

## 2019-03-29 NOTE — PLAN OF CARE
Problem: Occupational Therapy Goal  Goal: Occupational Therapy Goal  Goals to be met by: 4/8/19     Patient will increase functional independence with ADLs by performing:    UE Dressing with Stand-by Assistance. -MET 3/27/19     REVISED GOAL: UB dressing with SPV including clothing retrieval   LE Dressing with Stand-by Assistance. -MET 3/27/19     REVISED GOAL: LB dressing with SPV  Grooming while standing at sink with Stand-by Assistance.  MET 3/28/2019     REVISED GOAL:  Grooming while standing at sink with supervision.  Toileting from bedside commode with Stand-by Assistance for hygiene and clothing management.   Toilet transfer to bedside commode with Stand-by Assistance.         Pt is making progress towards goals.  He remains strong candidate for rehab.    BETTY Nguyen  3/29/2019

## 2019-03-29 NOTE — PLAN OF CARE
Problem: Adult Inpatient Plan of Care  Goal: Plan of Care Review     03/29/19 0140   Plan of Care Review   Plan of Care Reviewed With patient   Progress improving       Problem: Fall Injury Risk  Goal: Absence of Fall and Fall-Related Injury    Intervention: Identify and Manage Contributors to Fall Injury Risk     03/29/19 0140   Identify and Manage Contributors to Fall Injury Risk   Self-Care Promotion independence encouraged   Manage Acute Allergic Reaction   Medication Review/Management medications reviewed     Intervention: Promote Injury-Free Environment     03/29/19 0140   Optimize Morse and Functional Mobility   Environmental Safety Modification assistive device/personal items within reach;lighting adjusted;clutter free environment maintained

## 2019-03-29 NOTE — PLAN OF CARE
Problem: Physical Therapy Goal  Goal: Physical Therapy Goal  Goals to be met by: 19    Patient will increase functional independence with mobility by performin. Gait x 50 feet with rolling walker or least restrictive assistive device and supervision.  2. Ascend/descend curb step with rolling walker or least restrictive assistive device and supervision.   3. Sit<>stand with rolling walker or least restrictive assistive device and supervision.   4. Supine<>sit without use of hospital bed features and independence.        Pt progressing towards goals, sup to sit Supervision from flat bed with no rails, sit to stand SBA, amb'd 150' with RW and CGA, amb'd 150' without AD and CGA/min.A. Recommend cont PT in REHAB.

## 2019-03-29 NOTE — PT/OT/SLP PROGRESS
"Physical Therapy Treatment    Patient Name:  Dorian Murray   MRN:  [id number]    Recommendations:     Discharge Recommendations:  rehabilitation facility   Discharge Equipment Recommendations: (TBD pending progress)   Barriers to discharge: Inaccessible home and Decreased caregiver support    Assessment:     Dorian Murray is a 29 y.o. male admitted with a medical diagnosis of Myxedema coma.  He presents with the following impairments/functional limitations:  weakness, impaired self care skills, impaired functional mobilty, gait instability, impaired balance, visual deficits, impaired cognition, decreased lower extremity function, decreased safety awareness, impaired skin(pt with very dry skin on feet, small cracks noted behind great toes, MD Mcbride, notfied) ; pt with good mobility today, though still req's CGA/Marie for balance without AD, CGA for safety with RW. Pt c/o some pain in feet today, not able to perform "toe off" during gt training, inspected feet following session and small cracks in skin noted behind great toes. Pt highly motivated in sessions and would be an excellent REHAB candidate, especially given his previous independent level of function, working as a  and taking care of his daughter.    Rehab Prognosis: Good; patient would benefit from acute skilled PT services to address these deficits and reach maximum level of function.    Recent Surgery: * No surgery found *      Plan:     During this hospitalization, patient to be seen 6 x/week to address the identified rehab impairments via gait training, therapeutic activities, therapeutic exercises, neuromuscular re-education and progress toward the following goals:    · Plan of Care Expires:  04/08/19    Subjective     Chief Complaint: some pain in feet with amb., did not rate  Patient/Family Comments/goals: "My skin is real dry on my feet".  Pain/Comfort:  · Pain Rating 1: did not rate  · Pain Rating " Post-Intervention 1: did not rate      Objective:     Communicated with nurse prior to session.  Patient found supine with peripheral IV upon PT entry to room.     General Precautions: Standard, fall, vision impaired   Orthopedic Precautions:N/A   Braces: N/A     Functional Mobility:  · Bed Mobility:     · Supine to Sit: supervision  · Transfers:     · Sit to Stand:  stand by assistance with no AD  · Gait: amb'd 150' with RW and CGA, 150' without AD with CGA/min.A  · Balance: perf'd balance act's in standing consisting of :tandem gt., sidestepping, retro amb. with CGA/min.A      AM-PAC 6 CLICK MOBILITY  Turning over in bed (including adjusting bedclothes, sheets and blankets)?: 3  Sitting down on and standing up from a chair with arms (e.g., wheelchair, bedside commode, etc.): 3  Moving from lying on back to sitting on the side of the bed?: 3  Moving to and from a bed to a chair (including a wheelchair)?: 3  Need to walk in hospital room?: 3  Climbing 3-5 steps with a railing?: 2  Basic Mobility Total Score: 17       Therapeutic Activities and Exercises:   perf'd standing LE ex's of :heelraises, hip flex, hip abd, hs curls, squats x 10 ea.     Patient left up in chair with all lines intact, call button in reach and nurse notified..    GOALS:   Multidisciplinary Problems     Physical Therapy Goals        Problem: Physical Therapy Goal    Goal Priority Disciplines Outcome Goal Variances Interventions   Physical Therapy Goal     PT, PT/OT      Description:  Goals to be met by: 19    Patient will increase functional independence with mobility by performin. Gait x 50 feet with rolling walker or least restrictive assistive device and supervision.  2. Ascend/descend curb step with rolling walker or least restrictive assistive device and supervision.   3. Sit<>stand with rolling walker or least restrictive assistive device and supervision.   4. Supine<>sit without use of hospital bed features and independence.                          Time Tracking:     PT Received On: 03/29/19  PT Start Time: 1003     PT Stop Time: 1041  PT Total Time (min): 38 min     Billable Minutes: Gait Training 20, Therapeutic Activity 8 and Therapeutic Exercise 10    Treatment Type: Treatment  PT/PTA: PTA     PTA Visit Number: 4     Hilda Puente, PTA  03/29/2019

## 2019-03-29 NOTE — PT/OT/SLP PROGRESS
Occupational Therapy   Treatment    Name: Dorian Murray  MRN: [id number]  Admitting Diagnosis:  Myxedema coma       Recommendations:     Discharge Recommendations: rehabilitation facility  Discharge Equipment Recommendations:  (TBD pending progress)  Barriers to discharge:  Decreased caregiver support(pt is homeless)    Assessment:     Dorian Murray is a 29 y.o. male with a medical diagnosis of Myxedema coma.  He presents with the following performance deficits affecting function: weakness, impaired self care skills, impaired functional mobilty, gait instability, impaired cognition, impaired endurance, impaired balance, decreased lower extremity function, decreased safety awareness.  Session focused on pt's cognitive processes this date.  Pt's main deficits impacting his ability to perform ADLs/IADLs appear to be visual/perceptual.  He continues to demonstrate difficulty reading and spelling words.  He required increased time and cues to read several sentences.  When writing sentences some spelling errors noted with pt expressing he is having some difficulty knowing how to spell certain words.  Pt wants to be able to return to work as a  and is concerned about his reading ability at this time.  He would continue to benefit from skilled OT services to address problems listed above and increase independence with ADLs.  Rehab is recommended upon d/c from acute care to further address deficits and help pt improve overall functional independence.         Rehab Prognosis:  Good; patient would benefit from acute skilled OT services to address these deficits and reach maximum level of function.       Plan:     Patient to be seen 6 x/week to address the above listed problems via self-care/home management, therapeutic activities, therapeutic exercises, community/work re-entry  · Plan of Care Expires: 04/08/19  · Plan of Care Reviewed with: patient    Subjective     Pain/Comfort:  · Pain  Rating 1: 0/10  · Pain Rating Post-Intervention 1: 0/10    Objective:     Communicated with: RN prior to session.  Patient found standing at sink with RW with peripheral IV(IV not connected; pt standing at sink with RW upon arrival to room) upon OT entry to room.    General Precautions: Standard, fall, vision impaired   Orthopedic Precautions:N/A   Braces: N/A     Occupational Performance:     Bed Mobility:    · Not assessed 2* pt up in chair majority of session.    Functional Mobility/Transfers:  · Patient completed Sit <> Stand Transfer with stand by assistance  with  no assistive device and rolling walker x 1 trial from EOB.  · Functional Mobility: Pt ambulated 20 ft within room with SBA and RW.  No instances of LOB noted.    Activities of Daily Living:  · Grooming: stand by assistance with RW for washing and drying hands while standing at sink.      [facility] 6 Click ADL: 20    Treatment & Education:  *Pt aide clock on paper x 2 trials to address visual/perceptual deficits.  On first trial pt did not include 10 or 11 and left left upper 4th of clock blank.  On second trial numbers spaced appropriately for the most part, but included two number 12s.  When asked to draw hands to make the time be 3:30 pt able to do so.  *OT wrote 4 sentences and had pt read out loud.  Pt required increased time to initiate comprehension.  Unable to detect certain words.  Sentences included 1) This hospital is in [place] 2) It is warm outside today 3) I like to go for walks in the park 4) It is the third month of the year  *Pt practiced writing his name, the alphabet, and numbers 1-10.  Pt able to do all three tasks without difficulty.   *POC reviewed with pt    Patient left up in chair with call button in reach and RN notifiedEducation:      GOALS:   Multidisciplinary Problems     Occupational Therapy Goals        Problem: Occupational Therapy Goal    Goal Priority Disciplines Outcome Interventions   Occupational Therapy Goal      OT, PT/OT Ongoing (interventions implemented as appropriate)    Description:  Goals to be met by: 4/8/19     Patient will increase functional independence with ADLs by performing:    UE Dressing with Stand-by Assistance. -MET 3/27/19     REVISED GOAL: UB dressing with SPV including clothing retrieval   LE Dressing with Stand-by Assistance. -MET 3/27/19     REVISED GOAL: LB dressing with SPV  Grooming while standing at sink with Stand-by Assistance.  MET 3/28/2019     REVISED GOAL:  Grooming while standing at sink with supervision.  Toileting from bedside commode with Stand-by Assistance for hygiene and clothing management.   Toilet transfer to bedside commode with Stand-by Assistance.                        Time Tracking:     OT Date of Treatment: 03/29/19  OT Start Time: 1118  OT Stop Time: 1148  OT Total Time (min): 30 min    Billable Minutes:Therapeutic Activity 30    BETTY Nguyen  3/29/2019

## 2019-03-29 NOTE — PLAN OF CARE
Problem: Adult Inpatient Plan of Care  Goal: Plan of Care Review  Outcome: Ongoing (interventions implemented as appropriate)  Patient in no apparent distress. Sat's  94-98 % on room air. Patient is preforming IS and aerobika independantly . Will continue to monitor.

## 2019-03-29 NOTE — PLAN OF CARE
Astrid from [facility] reports they can receive patient after 3pm today - orders faxed to [phone] - report can be called to [phone] - ADT-30 completed for transport - requested  time of 3pm - patient & mother Luisana made aware - RN Essence updated      03/29/19 1236   Final Note   Assessment Type Final Discharge Note   Anticipated Discharge Disposition Rehab   What phone number can be called within the next 1-3 days to see how you are doing after discharge? [phone]   Discharge plans and expectations educations in teach back method with documentation complete? Yes   Right Care Referral Info   Post Acute Recommendation IRF   Facility Name [facility]

## 2019-03-30 NOTE — PROGRESS NOTES
Physical Therapy Discharge Summary    Name: Dorian Murray  MRN: [id number]   Principal Problem: Myxedema coma     Patient Discharged from acute Physical Therapy on 3-29-19.  Please refer to prior PT noted date on 3-29-19 for functional status.     Assessment:     Patient appropriate for care in another setting.    Objective:     GOALS:   Multidisciplinary Problems     Physical Therapy Goals        Problem: Physical Therapy Goal    Goal Priority Disciplines Outcome Goal Variances Interventions   Physical Therapy Goal     PT, PT/OT      Description:  Goals to be met by: 19    Patient will increase functional independence with mobility by performin. Gait x 50 feet with rolling walker or least restrictive assistive device and supervision.  2. Ascend/descend curb step with rolling walker or least restrictive assistive device and supervision.   3. Sit<>stand with rolling walker or least restrictive assistive device and supervision.   4. Supine<>sit without use of hospital bed features and independence.                         Reasons for Discontinuation of Therapy Services  Transfer to alternate level of care.      Plan:     Patient Discharged to: Inpatient Rehab.    Joseph Sánchez, PT  3/30/2019

## 2019-03-31 NOTE — PT/OT/SLP DISCHARGE
Occupational Therapy Discharge Summary    Dorian Murray  MRN: [id number]   Principal Problem: Myxedema coma      Patient Discharged from acute Occupational Therapy on 3/29/19.  Please refer to prior OT note dated 3/29/19 for functional status.    Assessment:      Goals partially met. Patient appropriate for care in another setting.    Objective:     GOALS:   Multidisciplinary Problems     Occupational Therapy Goals        Problem: Occupational Therapy Goal    Goal Priority Disciplines Outcome Interventions   Occupational Therapy Goal     OT, PT/OT Ongoing (interventions implemented as appropriate)    Description:  Goals to be met by: 4/8/19     Patient will increase functional independence with ADLs by performing:    UE Dressing with Stand-by Assistance. -MET 3/27/19     REVISED GOAL: UB dressing with SPV including clothing retrieval   LE Dressing with Stand-by Assistance. -MET 3/27/19     REVISED GOAL: LB dressing with SPV  Grooming while standing at sink with Stand-by Assistance.  MET 3/28/2019     REVISED GOAL:  Grooming while standing at sink with supervision.  Toileting from bedside commode with Stand-by Assistance for hygiene and clothing management.   Toilet transfer to bedside commode with Stand-by Assistance.                        Reasons for Discontinuation of Therapy Services  Transfer to alternate level of care.      Plan:     Patient Discharged to: Inpatient Rehab, IPR recommended.    BETTY Tobin  3/31/2019

## 2019-08-23 ENCOUNTER — OCCUPATIONAL HEALTH (OUTPATIENT)
Dept: URGENT CARE | Facility: CLINIC | Age: 29
End: 2019-08-23

## 2019-08-23 PROCEDURE — 80305 DRUG TEST PRSMV DIR OPT OBS: CPT | Mod: S$GLB,,, | Performed by: EMERGENCY MEDICINE

## 2019-08-23 PROCEDURE — 80305 PR DRUG SCREEN - 1: ICD-10-PCS | Mod: S$GLB,,, | Performed by: EMERGENCY MEDICINE

## 2020-05-13 ENCOUNTER — OFFICE VISIT (OUTPATIENT)
Dept: FAMILY MEDICINE | Facility: CLINIC | Age: 30
End: 2020-05-13
Payer: OTHER GOVERNMENT

## 2020-05-13 ENCOUNTER — LAB VISIT (OUTPATIENT)
Dept: LAB | Facility: HOSPITAL | Age: 30
End: 2020-05-13
Attending: FAMILY MEDICINE
Payer: OTHER GOVERNMENT

## 2020-05-13 VITALS
HEART RATE: 72 BPM | OXYGEN SATURATION: 98 % | DIASTOLIC BLOOD PRESSURE: 82 MMHG | HEIGHT: 68 IN | SYSTOLIC BLOOD PRESSURE: 112 MMHG | BODY MASS INDEX: 33.14 KG/M2 | TEMPERATURE: 98 F | RESPIRATION RATE: 16 BRPM | WEIGHT: 218.63 LBS

## 2020-05-13 DIAGNOSIS — S49.91XA INJURY OF RIGHT SHOULDER, INITIAL ENCOUNTER: Primary | ICD-10-CM

## 2020-05-13 DIAGNOSIS — E03.9 HYPOTHYROIDISM, UNSPECIFIED TYPE: ICD-10-CM

## 2020-05-13 LAB
T4 FREE SERPL-MCNC: <0.25 NG/DL (ref 0.71–1.51)
TSH SERPL DL<=0.005 MIU/L-ACNC: >500 UIU/ML (ref 0.34–5.6)
TSH SERPL DL<=0.005 MIU/L-ACNC: >500 UIU/ML (ref 0.34–5.6)

## 2020-05-13 PROCEDURE — 84436 ASSAY OF TOTAL THYROXINE: CPT | Mod: XB

## 2020-05-13 PROCEDURE — 84439 ASSAY OF FREE THYROXINE: CPT

## 2020-05-13 PROCEDURE — 99204 PR OFFICE/OUTPT VISIT, NEW, LEVL IV, 45-59 MIN: ICD-10-PCS | Mod: S$PBB,,, | Performed by: FAMILY MEDICINE

## 2020-05-13 PROCEDURE — 84443 ASSAY THYROID STIM HORMONE: CPT

## 2020-05-13 PROCEDURE — 99204 OFFICE O/P NEW MOD 45 MIN: CPT | Performed by: FAMILY MEDICINE

## 2020-05-13 PROCEDURE — 36415 COLL VENOUS BLD VENIPUNCTURE: CPT

## 2020-05-13 PROCEDURE — 84481 FREE ASSAY (FT-3): CPT

## 2020-05-13 PROCEDURE — 99204 OFFICE O/P NEW MOD 45 MIN: CPT | Mod: S$PBB,,, | Performed by: FAMILY MEDICINE

## 2020-05-13 RX ORDER — LEVOTHYROXINE SODIUM 150 UG/1
150 TABLET ORAL
Qty: 30 TABLET | Refills: 11 | Status: SHIPPED | OUTPATIENT
Start: 2020-05-13 | End: 2021-05-13
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
## 2020-05-13 NOTE — PROGRESS NOTES
Thyroid is very weak as expected ensure off the medicine.  Resume 0.125 mg levothyroxine and will recheck in 6 weeks.

## 2020-05-14 ENCOUNTER — TELEPHONE (OUTPATIENT)
Dept: FAMILY MEDICINE | Facility: CLINIC | Age: 30
End: 2020-05-14

## 2020-05-14 LAB
T3FREE SERPL-MCNC: 0.9 PG/ML (ref 2–4.4)
T4 SERPL-MCNC: 2.4 UG/DL (ref 4.5–12)
T4 SERPL-MCNC: 2.4 UG/DL (ref 4.5–12)

## 2020-07-06 ENCOUNTER — PATIENT MESSAGE (OUTPATIENT)
Dept: FAMILY MEDICINE | Facility: CLINIC | Age: 30
End: 2020-07-06

## 2020-07-06 DIAGNOSIS — E03.9 HYPOTHYROIDISM, UNSPECIFIED TYPE: Primary | ICD-10-CM

## 2020-07-10 ENCOUNTER — PATIENT MESSAGE (OUTPATIENT)
Dept: FAMILY MEDICINE | Facility: CLINIC | Age: 30
End: 2020-07-10

## 2020-07-10 DIAGNOSIS — E03.9 HYPOTHYROIDISM, UNSPECIFIED TYPE: Primary | ICD-10-CM

## 2020-09-25 ENCOUNTER — PATIENT MESSAGE (OUTPATIENT)
Dept: FAMILY MEDICINE | Facility: CLINIC | Age: 30
End: 2020-09-25

## 2020-10-08 ENCOUNTER — LAB VISIT (OUTPATIENT)
Dept: LAB | Facility: HOSPITAL | Age: 30
End: 2020-10-08
Attending: FAMILY MEDICINE
Payer: OTHER GOVERNMENT

## 2020-10-08 ENCOUNTER — TELEPHONE (OUTPATIENT)
Dept: FAMILY MEDICINE | Facility: CLINIC | Age: 30
End: 2020-10-08

## 2020-10-08 DIAGNOSIS — E03.9 HYPOTHYROIDISM, UNSPECIFIED TYPE: ICD-10-CM

## 2020-10-08 LAB
T4 FREE SERPL-MCNC: 0.4 NG/DL (ref 0.71–1.51)
TSH SERPL DL<=0.005 MIU/L-ACNC: 59.03 UIU/ML (ref 0.34–5.6)
TSH SERPL DL<=0.005 MIU/L-ACNC: 59.03 UIU/ML (ref 0.34–5.6)

## 2020-10-08 PROCEDURE — 84436 ASSAY OF TOTAL THYROXINE: CPT

## 2020-10-08 PROCEDURE — 84481 FREE ASSAY (FT-3): CPT

## 2020-10-08 PROCEDURE — 84439 ASSAY OF FREE THYROXINE: CPT

## 2020-10-08 PROCEDURE — 36415 COLL VENOUS BLD VENIPUNCTURE: CPT

## 2020-10-08 PROCEDURE — 84443 ASSAY THYROID STIM HORMONE: CPT

## 2020-10-08 RX ORDER — LEVOTHYROXINE SODIUM 200 UG/1
200 TABLET ORAL
Qty: 30 TABLET | Refills: 11 | Status: SHIPPED | OUTPATIENT
Start: 2020-10-08 | End: 2021-11-09

## 2020-10-08 NOTE — TELEPHONE ENCOUNTER
Advised patient the thyroid is still weak.  We will increase his levothyroxine to 200 mcg and repeat the numbers in 3 months  New prescription is at pharmacy and lab orders are placed to repeat  In 3 months patient gave verbal understanding of labs

## 2020-10-08 NOTE — TELEPHONE ENCOUNTER
----- Message from Kyrie Self MD sent at 10/8/2020  1:07 PM CDT -----  The thyroid is still weak.  We will increase his levothyroxine to 200 mcg and repeat the numbers in 3 months.

## 2020-10-08 NOTE — PROGRESS NOTES
The thyroid is still weak.  We will increase his levothyroxine to 200 mcg and repeat the numbers in 3 months.

## 2020-10-09 LAB
T3FREE SERPL-MCNC: 2 PG/ML (ref 2–4.4)
T4 SERPL-MCNC: 3.5 UG/DL (ref 4.5–12)
T4 SERPL-MCNC: 3.5 UG/DL (ref 4.5–12)

## 2020-10-09 NOTE — PROGRESS NOTES
As expected the thyroid function is slightly low we did increase the dose yesterday we need to recheck it in 3 months.

## 2020-11-30 ENCOUNTER — PATIENT MESSAGE (OUTPATIENT)
Dept: FAMILY MEDICINE | Facility: CLINIC | Age: 30
End: 2020-11-30

## 2020-11-30 DIAGNOSIS — E03.9 HYPOTHYROIDISM, UNSPECIFIED TYPE: ICD-10-CM

## 2020-11-30 DIAGNOSIS — Z00.00 PREVENTATIVE HEALTH CARE: Primary | ICD-10-CM

## 2020-11-30 NOTE — TELEPHONE ENCOUNTER
Testosterone levels are not done routinely and will be based on history will determine at when you come in.

## 2020-12-03 ENCOUNTER — PATIENT MESSAGE (OUTPATIENT)
Dept: FAMILY MEDICINE | Facility: CLINIC | Age: 30
End: 2020-12-03

## 2020-12-04 ENCOUNTER — LAB VISIT (OUTPATIENT)
Dept: LAB | Facility: HOSPITAL | Age: 30
End: 2020-12-04
Attending: FAMILY MEDICINE
Payer: OTHER GOVERNMENT

## 2020-12-04 DIAGNOSIS — Z00.00 PREVENTATIVE HEALTH CARE: ICD-10-CM

## 2020-12-04 DIAGNOSIS — E03.9 HYPOTHYROIDISM, UNSPECIFIED TYPE: ICD-10-CM

## 2020-12-04 LAB
ALBUMIN SERPL BCP-MCNC: 4.1 G/DL (ref 3.5–5.2)
ALP SERPL-CCNC: 66 U/L (ref 55–135)
ALT SERPL W/O P-5'-P-CCNC: 35 U/L (ref 10–44)
ANION GAP SERPL CALC-SCNC: 5 MMOL/L (ref 8–16)
AST SERPL-CCNC: 28 U/L (ref 10–40)
BILIRUB SERPL-MCNC: 0.6 MG/DL (ref 0.1–1)
BUN SERPL-MCNC: 15 MG/DL (ref 6–20)
CALCIUM SERPL-MCNC: 9 MG/DL (ref 8.7–10.5)
CHLORIDE SERPL-SCNC: 107 MMOL/L (ref 95–110)
CHOLEST SERPL-MCNC: 219 MG/DL (ref 120–199)
CHOLEST/HDLC SERPL: 6.6 {RATIO} (ref 2–5)
CO2 SERPL-SCNC: 25 MMOL/L (ref 23–29)
CREAT SERPL-MCNC: 0.8 MG/DL (ref 0.5–1.4)
EST. GFR  (AFRICAN AMERICAN): >60 ML/MIN/1.73 M^2
EST. GFR  (NON AFRICAN AMERICAN): >60 ML/MIN/1.73 M^2
GLUCOSE SERPL-MCNC: 93 MG/DL (ref 70–110)
HDLC SERPL-MCNC: 33 MG/DL (ref 40–75)
HDLC SERPL: 15.1 % (ref 20–50)
LDLC SERPL CALC-MCNC: 151 MG/DL (ref 63–159)
NONHDLC SERPL-MCNC: 186 MG/DL
POTASSIUM SERPL-SCNC: 3.7 MMOL/L (ref 3.5–5.1)
PROT SERPL-MCNC: 7.6 G/DL (ref 6–8.4)
SODIUM SERPL-SCNC: 137 MMOL/L (ref 136–145)
TRIGL SERPL-MCNC: 175 MG/DL (ref 30–150)
TSH SERPL DL<=0.005 MIU/L-ACNC: 2.55 UIU/ML (ref 0.34–5.6)
TSH SERPL DL<=0.005 MIU/L-ACNC: 2.55 UIU/ML (ref 0.34–5.6)

## 2020-12-04 PROCEDURE — 84443 ASSAY THYROID STIM HORMONE: CPT

## 2020-12-04 PROCEDURE — 80053 COMPREHEN METABOLIC PANEL: CPT

## 2020-12-04 PROCEDURE — 86803 HEPATITIS C AB TEST: CPT

## 2020-12-04 PROCEDURE — 84436 ASSAY OF TOTAL THYROXINE: CPT

## 2020-12-04 PROCEDURE — 36415 COLL VENOUS BLD VENIPUNCTURE: CPT

## 2020-12-04 PROCEDURE — 80061 LIPID PANEL: CPT

## 2020-12-04 PROCEDURE — 87389 HIV-1 AG W/HIV-1&-2 AB AG IA: CPT

## 2020-12-05 LAB
HCV AB S/CO SERPL IA: <0.1 S/CO RATIO (ref 0–0.9)
HIV 1+2 AB+HIV1 P24 AG SERPL QL IA: NON REACTIVE
T4 SERPL-MCNC: 6.2 UG/DL (ref 4.5–12)
T4 SERPL-MCNC: 6.2 UG/DL (ref 4.5–12)

## 2020-12-07 ENCOUNTER — TELEPHONE (OUTPATIENT)
Dept: FAMILY MEDICINE | Facility: CLINIC | Age: 30
End: 2020-12-07

## 2020-12-07 ENCOUNTER — PATIENT MESSAGE (OUTPATIENT)
Dept: FAMILY MEDICINE | Facility: CLINIC | Age: 30
End: 2020-12-07

## 2020-12-07 DIAGNOSIS — Z00.00 PREVENTATIVE HEALTH CARE: Primary | ICD-10-CM

## 2020-12-07 NOTE — TELEPHONE ENCOUNTER
Pt requested a testosterone lab to be drawn as well. This was not ordered with previous lab work. Can this be ordered?

## 2020-12-07 NOTE — TELEPHONE ENCOUNTER
----- Message from Kyrie Self MD sent at 12/4/2020 12:05 PM CST -----  Results Ok, notify patient.

## 2021-04-13 ENCOUNTER — PATIENT MESSAGE (OUTPATIENT)
Dept: FAMILY MEDICINE | Facility: CLINIC | Age: 31
End: 2021-04-13

## 2022-01-28 ENCOUNTER — OCCUPATIONAL HEALTH (OUTPATIENT)
Dept: URGENT CARE | Facility: CLINIC | Age: 32
End: 2022-01-28

## 2022-01-28 DIAGNOSIS — Z13.9 ENCOUNTER FOR SCREENING: Primary | ICD-10-CM

## 2022-01-28 PROCEDURE — 80305 DRUG TEST PRSMV DIR OPT OBS: CPT | Mod: S$GLB,,, | Performed by: EMERGENCY MEDICINE

## 2022-01-28 PROCEDURE — 80305 OOH COLLECTION ONLY DRUG SCREEN: ICD-10-PCS | Mod: S$GLB,,, | Performed by: EMERGENCY MEDICINE
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
